# Patient Record
Sex: FEMALE | Race: OTHER | URBAN - METROPOLITAN AREA
[De-identification: names, ages, dates, MRNs, and addresses within clinical notes are randomized per-mention and may not be internally consistent; named-entity substitution may affect disease eponyms.]

---

## 2017-03-09 ENCOUNTER — OFFICE VISIT (OUTPATIENT)
Dept: FAMILY MEDICINE CLINIC | Age: 82
End: 2017-03-09

## 2017-03-09 VITALS
DIASTOLIC BLOOD PRESSURE: 62 MMHG | WEIGHT: 129 LBS | TEMPERATURE: 97.5 F | BODY MASS INDEX: 24.37 KG/M2 | HEART RATE: 65 BPM | SYSTOLIC BLOOD PRESSURE: 122 MMHG

## 2017-03-09 DIAGNOSIS — E03.9 ACQUIRED HYPOTHYROIDISM: Primary | ICD-10-CM

## 2017-03-09 DIAGNOSIS — I10 ESSENTIAL HYPERTENSION: ICD-10-CM

## 2017-03-09 DIAGNOSIS — E78.49 OTHER HYPERLIPIDEMIA: ICD-10-CM

## 2017-03-09 DIAGNOSIS — Z71.89 COUNSELING AND COORDINATION OF CARE: Primary | ICD-10-CM

## 2017-03-09 DIAGNOSIS — D64.9 ANEMIA, UNSPECIFIED TYPE: ICD-10-CM

## 2017-03-09 DIAGNOSIS — Z13.9 SCREENING: ICD-10-CM

## 2017-03-09 LAB — HGB BLD-MCNC: 11.5 G/DL

## 2017-03-09 NOTE — PROGRESS NOTES
Subjective:     Ifeanyi Rdz is a [de-identified] y.o. female who presents for follow up of htn, hyperlipidemia, hypothyroidism. Missed GI appt and was rescheduled for 5/24. Has refills on all meds. Cardiovascular ROS: taking medications as instructed, no medication side effects noted, no TIA's, no chest pain on exertion, no dyspnea on exertion, no swelling of ankles. Gets dizzy sometimes when she stands or when she misses a meal.  New concerns: none.      Patient Active Problem List   Diagnosis Code    Acquired hypothyroidism E03.9    Essential hypertension I10    Hyperlipidemia E78.5    DVT (deep venous thrombosis) ([facility]) I82.409     Past Medical History:   Diagnosis Date    Hypercholesterolemia     Hypertension     Thyroid disease      Social History   Substance Use Topics    Smoking status: Never Smoker    Smokeless tobacco: Not on file    Alcohol use No        Lab Results  Component Value Date/Time   Cholesterol, total 173 01/12/2016 11:47 AM   HDL Cholesterol 58 01/12/2016 11:47 AM   LDL, calculated 83.4 01/12/2016 11:47 AM   Triglyceride 158 01/12/2016 11:47 AM   CHOL/HDL Ratio 3.0 01/12/2016 11:47 AM       Lab Results  Component Value Date/Time   GFR est AA >60 10/27/2016 11:07 AM   GFR est non-AA >60 10/27/2016 11:07 AM   Creatinine 0.72 10/27/2016 11:07 AM   BUN 16 10/27/2016 11:07 AM   Sodium 137 10/27/2016 11:07 AM   Potassium 4.8 10/27/2016 11:07 AM   Chloride 104 10/27/2016 11:07 AM   CO2 27 10/27/2016 11:07 AM      Lab Results  Component Value Date/Time   TSH 0.55 10/27/2016 11:07 AM      No results found for: HBA1C, UMA2ZCWR, HGBE8, CXO4ZJHR, CRM2ZJFX        Objective:     Vitals 3/9/2017 12/22/2016 10/27/2016 4/6/2016 2/11/2016 2/4/2016 2/4/2016   Blood Pressure 122/62 129/68 133/79 137/76 151/79 174/93 165/86   Pulse 65 71 72 73 70 69 68   Temp 97.5 98.6 98 98 97.5 - 97.8   Resp - - - 16 - - -   Height - 5' 1\" - 5' 0.63\" 4' 11.843\" - 4' 11.843\"   Weight 129 lb 130 lb 131 lb 134 lb 3.2 oz 136 lb 6.4 oz - 136 lb   SpO2 - - - 99 - - -   BSA - 1.59 m2 - 1.61 m2 1.62 m2 - 1.61 m2   BMI - 24.56 kg/m2 - 25.67 kg/m2 26.78 kg/m2 - 26.7 kg/m2   BP comment - - - - - - [place]       Appearance: alert, well appearing, and in no distress. General exam: CVS exam BP noted to be well controlled today in office, S1, S2 normal, no gallop, no murmur, chest clear, no JVD, no HSM, no edema. Lab review:     Assessment/Plan:     hypertension well controlled, hyperlipidemia well controlled, hypothyroidism well-conntrolled  Has enough meds for 3 months, will give appt to f/u then. H/o mild anemia, recheck hb today and has GI eval scheduled.

## 2017-03-09 NOTE — PROGRESS NOTES
Results for orders placed or performed in visit on 03/09/17   AMB POC HEMOGLOBIN (HGB)   Result Value Ref Range    Hemoglobin (POC) 11.5

## 2017-03-16 ENCOUNTER — TELEPHONE (OUTPATIENT)
Dept: FAMILY MEDICINE CLINIC | Age: 82
End: 2017-03-16

## 2017-03-16 NOTE — TELEPHONE ENCOUNTER
Patient called today and stated she has not received any of her medication for her B/P as well as the cholesterol    Please call patient back       Call routed to  Call back nurse and Dr.Eddy MONTES DE OCA [facility] [place], [facility]

## 2017-03-16 NOTE — TELEPHONE ENCOUNTER
Called to pt w/ assistance of [employer] #[phone]. Reviewed on 12/22/16 the provider sent 6 months of medications in - thyroid, cholesterol, bp . Reviewed the bottles will be for 1 month and she will need to go back monthly for refills. Instructed to go to pharmacy for refills. Called to pharmacy and they have the refills on file.

## 2017-03-21 DIAGNOSIS — D64.9 ANEMIA, UNSPECIFIED TYPE: Primary | ICD-10-CM

## 2017-03-27 ENCOUNTER — TELEPHONE (OUTPATIENT)
Dept: FAMILY MEDICINE CLINIC | Age: 82
End: 2017-03-27

## 2017-03-27 NOTE — TELEPHONE ENCOUNTER
Attempted to contact the pt via phone. No answer on the phone. A voice mail message was left to return the call to the C[facility], and ask for the nurse.  Allan Burgess RN

## 2017-03-29 NOTE — PROGRESS NOTES
TC placed to pt, she plans to come to [facility] site tomorrow 3/30/17 for labs only visit, non-fasting to check Iron level.

## 2017-03-30 ENCOUNTER — HOSPITAL ENCOUNTER (OUTPATIENT)
Dept: LAB | Age: 82
Discharge: HOME | End: 2017-03-30

## 2017-03-30 ENCOUNTER — CLINICAL SUPPORT (OUTPATIENT)
Dept: FAMILY MEDICINE CLINIC | Age: 82
End: 2017-03-30

## 2017-03-30 DIAGNOSIS — D64.9 ANEMIA, UNSPECIFIED TYPE: ICD-10-CM

## 2017-03-30 DIAGNOSIS — Z13.9 SCREENING: Primary | ICD-10-CM

## 2017-03-30 LAB
IRON SATN MFR SERPL: 23 % (ref 20–50)
IRON SERPL-MCNC: 68 UG/DL (ref 35–150)
TIBC SERPL-MCNC: 293 UG/DL (ref 250–450)

## 2017-03-30 PROCEDURE — 83540 ASSAY OF IRON: CPT | Performed by: FAMILY MEDICINE

## 2017-03-30 NOTE — PROGRESS NOTES
Patient here for labs only, labs drawn was a Iron Profile drawn on the right arm, patient left lab area with no signs of bleeding or complaints, and was told a nurse or dr would call with results, with the help of Cookie Thakkar.  Zaid Payne MA.

## 2017-04-15 DIAGNOSIS — I10 ESSENTIAL HYPERTENSION: ICD-10-CM

## 2017-04-18 RX ORDER — LISINOPRIL 10 MG/1
TABLET ORAL
Qty: 30 TAB | Refills: 0 | Status: SHIPPED | OUTPATIENT
Start: 2017-04-18 | End: 2017-07-03 | Stop reason: SDUPTHER

## 2017-05-04 ENCOUNTER — CLINICAL SUPPORT (OUTPATIENT)
Dept: FAMILY MEDICINE CLINIC | Age: 82
End: 2017-05-04

## 2017-05-04 DIAGNOSIS — Z71.9 COUNSELED BY NURSE: Primary | ICD-10-CM

## 2017-05-04 NOTE — PROGRESS NOTES
S/w pt with  Kristie Manuel  Pt is in clinic today stating she is out of her Thyroid medication. The only prescription she had on hand was the one she received from [place]. Pt never picked up the prescription from [facility] [facility] in December. Call made to [facility], pt had prescription on file for the levothyroxine, with 6 refills. Pharmacy will fill it for her today. According to the pharmacy pt has the following available as of today: Pravastatin 2 months  Levothyroxine- 6 months  Lisinopril- 4 months  Pt will make appt to follow up in July with Dr An Arvizu. Pt verbalized understanding and will  rx.

## 2017-05-24 ENCOUNTER — OFFICE VISIT (OUTPATIENT)
Dept: FAMILY MEDICINE CLINIC | Age: 82
End: 2017-05-24

## 2017-05-24 DIAGNOSIS — M47.812 ARTHRITIS OF NECK: Primary | ICD-10-CM

## 2017-05-25 NOTE — PROGRESS NOTES
ORTHO CONSULT NOTE    Subjective:     Date of Consultation:  May 25, 2017    Loi Beard is a [de-identified] y.o. female who is being seen for right arm, shoulder and neck pain intermittiently for 3 years. Denies any specific event. States she worked hard all of her life to care for her children and grandchildren and that may have caused her symptoms. She takes Tramadol on occasion which helps. She seems most concerned about her H/O blood clots in 2011 where she was partially treated with Lovenox but due to the expense did not complete the prescribed course. She says it was in her left leg. She denies any leg pain, no SOB, dyspnea or anxiety. Denies any numbness, tingling or weakness in the right arm, she is RHD and has not had any pain for at least the past week      Patient Active Problem List    Diagnosis Date Noted    Acquired hypothyroidism 01/12/2016    Essential hypertension 01/12/2016    Hyperlipidemia 01/12/2016    DVT (deep venous thrombosis) ([facility] [address] 01/12/2016     Family History   Problem Relation Age of Onset    Family history unknown: Yes      Social History   Substance Use Topics    Smoking status: Never Smoker    Smokeless tobacco: Not on file    Alcohol use No     Past Medical History:   Diagnosis Date    Hypercholesterolemia     Hypertension     Thyroid disease       Past Surgical History:   Procedure Laterality Date    HX ORTHOPAEDIC      left ankle surgery. Prior to Admission medications    Medication Sig Start Date End Date Taking? Authorizing Provider   lisinopril (PRINIVIL, ZESTRIL) 10 mg tablet TAKE ONE TABLET BY MOUTH ONCE DAILY 4/18/17   Justo Triana NP   levothyroxine (SYNTHROID) 150 mcg tablet Take 0.5 Tabs by mouth Daily (before breakfast). 12/22/16   MARCE Woodruff   pravastatin (PRAVACHOL) 10 mg tablet Take 1 Tab by mouth nightly. 12/22/16   MARCE Woodruff   traMADol (ULTRAM) 50 mg tablet Take 0.5 Tabs by mouth every six (6) hours as needed for Pain.  Max Daily Amount: 100 mg. 10/27/16   [facility], NP   aspirin delayed-release 81 mg tablet Take  by mouth daily. Historical Provider   acetaminophen (TYLENOL) 650 mg CR tablet Take 650 mg by mouth every six (6) hours as needed for Pain. Historical Provider   Aspirin, Buffered 81 mg tab Take  by mouth. Historical Provider     Current Outpatient Prescriptions   Medication Sig    lisinopril (PRINIVIL, ZESTRIL) 10 mg tablet TAKE ONE TABLET BY MOUTH ONCE DAILY    levothyroxine (SYNTHROID) 150 mcg tablet Take 0.5 Tabs by mouth Daily (before breakfast).  pravastatin (PRAVACHOL) 10 mg tablet Take 1 Tab by mouth nightly.  traMADol (ULTRAM) 50 mg tablet Take 0.5 Tabs by mouth every six (6) hours as needed for Pain. Max Daily Amount: 100 mg.  aspirin delayed-release 81 mg tablet Take  by mouth daily.  acetaminophen (TYLENOL) 650 mg CR tablet Take 650 mg by mouth every six (6) hours as needed for Pain.  Aspirin, Buffered 81 mg tab Take  by mouth. No current facility-administered medications for this visit. No Known Allergies     Review of Systems:  A comprehensive review of systems was negative except for that written in the HPI. Mental Status:  Alert and oriented      Objective:     Exam: alert, cooperative, no distress, appears stated age,    Neuro: no focal deficits. Extremities: No deformity or limitations in ROM of the arm, shoulder, and neck. DTR's 2/4 and symmetrical, negative Daugherty's and Froment's signs. No atrophy    Imaging Review: None available for review      Plan:   Probable arthritis of the neck and shoulder with possible radiating pain. Recommend ice and Tramadol. ROM exercises of the neck and shoulder. If symptoms persist, x-rays may be useful to determine specific diagnosis, though treatment options likely limited except for conservative ones.   F/U with PCP regarding concern for residual DVT in [address] [address], [place]

## 2017-07-03 ENCOUNTER — OFFICE VISIT (OUTPATIENT)
Dept: FAMILY MEDICINE CLINIC | Age: 82
End: 2017-07-03

## 2017-07-03 ENCOUNTER — HOSPITAL ENCOUNTER (OUTPATIENT)
Dept: LAB | Age: 82
Discharge: HOME | End: 2017-07-03

## 2017-07-03 VITALS
HEART RATE: 64 BPM | DIASTOLIC BLOOD PRESSURE: 69 MMHG | TEMPERATURE: 97.9 F | WEIGHT: 127 LBS | SYSTOLIC BLOOD PRESSURE: 128 MMHG | BODY MASS INDEX: 24 KG/M2

## 2017-07-03 DIAGNOSIS — E03.9 ACQUIRED HYPOTHYROIDISM: Primary | ICD-10-CM

## 2017-07-03 DIAGNOSIS — E03.9 ACQUIRED HYPOTHYROIDISM: ICD-10-CM

## 2017-07-03 DIAGNOSIS — Z13.9 ENCOUNTER FOR SCREENING: ICD-10-CM

## 2017-07-03 DIAGNOSIS — I10 ESSENTIAL HYPERTENSION: ICD-10-CM

## 2017-07-03 LAB
BASOPHILS # BLD AUTO: 0 K/UL (ref 0–0.1)
BASOPHILS # BLD: 0 % (ref 0–1)
EOSINOPHIL # BLD: 0 K/UL (ref 0–0.4)
EOSINOPHIL NFR BLD: 1 % (ref 0–7)
ERYTHROCYTE [DISTWIDTH] IN BLOOD BY AUTOMATED COUNT: 15.2 % (ref 11.5–14.5)
GLUCOSE POC: NORMAL MG/DL
HCT VFR BLD AUTO: 33.4 % (ref 35–47)
HGB BLD-MCNC: 10.2 G/DL
HGB BLD-MCNC: 10.3 G/DL (ref 11.5–16)
LYMPHOCYTES # BLD AUTO: 38 % (ref 12–49)
LYMPHOCYTES # BLD: 2.3 K/UL (ref 0.8–3.5)
MCH RBC QN AUTO: 26.9 PG (ref 26–34)
MCHC RBC AUTO-ENTMCNC: 30.8 G/DL (ref 30–36.5)
MCV RBC AUTO: 87.2 FL (ref 80–99)
MONOCYTES # BLD: 0.4 K/UL (ref 0–1)
MONOCYTES NFR BLD AUTO: 7 % (ref 5–13)
NEUTS SEG # BLD: 3.3 K/UL (ref 1.8–8)
NEUTS SEG NFR BLD AUTO: 54 % (ref 32–75)
PLATELET # BLD AUTO: 216 K/UL (ref 150–400)
RBC # BLD AUTO: 3.83 M/UL (ref 3.8–5.2)
TSH SERPL DL<=0.05 MIU/L-ACNC: 0.84 UIU/ML (ref 0.36–3.74)
WBC # BLD AUTO: 6 K/UL (ref 3.6–11)

## 2017-07-03 PROCEDURE — 85025 COMPLETE CBC W/AUTO DIFF WBC: CPT | Performed by: FAMILY MEDICINE

## 2017-07-03 PROCEDURE — 84443 ASSAY THYROID STIM HORMONE: CPT | Performed by: FAMILY MEDICINE

## 2017-07-03 RX ORDER — LISINOPRIL 10 MG/1
TABLET ORAL
Qty: 30 TAB | Refills: 2 | Status: SHIPPED | OUTPATIENT
Start: 2017-07-03 | End: 2017-10-19 | Stop reason: DRUGHIGH

## 2017-07-03 RX ORDER — PRAVASTATIN SODIUM 10 MG/1
10 TABLET ORAL
Qty: 30 TAB | Refills: 5 | Status: SHIPPED | OUTPATIENT
Start: 2017-07-03 | End: 2017-10-19 | Stop reason: SDUPTHER

## 2017-07-03 NOTE — PROGRESS NOTES
Coordination of Care  1. Have you been to the ER, urgent care clinic since your last visit? Hospitalized since your last visit? No    2. Have you seen or consulted any other health care providers outside of the [address] [place] [address] since your last visit? Include any pap smears or colon screening.  No    Medications  Medication Reconciliation Performed: no  Patient does need refills     Learning Assessment Complete? yes  Results for orders placed or performed in visit on 07/03/17   AMB POC GLUCOSE BLOOD, BY GLUCOSE MONITORING DEVICE   Result Value Ref Range    Glucose POC 99 NF mg/dL   AMB POC HEMOGLOBIN (HGB)   Result Value Ref Range    Hemoglobin (POC) 10.2

## 2017-07-03 NOTE — PATIENT INSTRUCTIONS
Tiene poco de anemia. Es importante a comer talia, incluyendo frutas, vegetales, carne, carbohydratas.

## 2017-07-03 NOTE — MR AVS SNAPSHOT
Visit Information Sienna Gonzaleztylerangie Personal Médico Departamento Teléfono del Dep. Número de visita 7/3/2017  8:30 AM Munir Green MD [address] [phone] Upcoming Health Maintenance Date Due ZOSTER VACCINE AGE 60> 11/23/1996 GLAUCOMA SCREENING Q2Y 11/23/2001 OSTEOPOROSIS SCREENING (DEXA) 11/23/2001 MEDICARE YEARLY EXAM 11/23/2001 Pneumococcal 65+ Low/Medium Risk (2 of 2 - PCV13) 4/21/2017 INFLUENZA AGE 9 TO ADULT 8/1/2017 DTaP/Tdap/Td series (2 - Td) 4/21/2026 Alergias  Review Complete El: 5/25/2017 Por: MARCE Hastings  
 A partir del:  7/3/2017 No Known Allergies Vacunas actuales Revisadas el:  4/21/2016 Lorrie Joe Influenza Vaccine 12/22/2016, 1/12/2016 Influenza Vaccine (Quad) PF 12/22/2016, 1/12/2016 Pneumococcal Polysaccharide (PPSV-23) 4/21/2016 TB Skin Test (PPD) 4/6/2016 Tdap 4/21/2016 No revisadas esta visita You Were Diagnosed With   
  
 Eh Degree Acquired hypothyroidism    -  Primary ICD-10-CM: E03.9 ICD-9-CM: 244.9 Encounter for screening     ICD-10-CM: Z13.9 ICD-9-CM: V82.9 Partes vitales PS Pulso Temperatura Peso (percentil de crecimiento) BMI (IMC) Estado obstétrico  
 128/69 (BP 1 Location: Right arm, BP Patient Position: Sitting) 64 97.9 °F (36.6 °C) (Oral) 127 lb (57.6 kg) 24 kg/m2 Postmenopausal  
 Estatus de tabaquísmo Never Smoker Historial de signos vitales BMI and BSA Data Body Mass Index Body Surface Area  
 24 kg/m 2 1.57 m 2 [facility] Name Phone Dina Oliver [facility] [phone] Flanagan lista de medicamentos actualizada Lista actualizada el: 7/3/17  9:26 AM.  Raynaldo Quails use flanagan lista de medicamentos más reciente. acetaminophen 650 mg CR tablet También conocido parviz:  TYLENOL  
 Take 650 mg by mouth every six (6) hours as needed for Pain. aspirin delayed-release 81 mg tablet Take  by mouth daily. aspirin, buffered 81 mg Tab Take  by mouth.  
  
 levothyroxine 150 mcg tablet También conocido parviz:  SYNTHROID Take 0.5 Tabs by mouth Daily (before breakfast). lisinopril 10 mg tablet También conocido parviz:  PRINIVIL, ZESTRIL  
TAKE ONE TABLET BY MOUTH ONCE DAILY pravastatin 10 mg tablet También conocido parviz:  PRAVACHOL Take 1 Tab by mouth nightly. traMADol 50 mg tablet También conocido parviz:  ULTRAM  
Take 0.5 Tabs by mouth every six (6) hours as needed for Pain. Max Daily Amount: 100 mg. Hicimos lo siguiente AMB POC GLUCOSE BLOOD, BY GLUCOSE MONITORING DEVICE [64020 CPT(R)] AMB POC HEMOGLOBIN (HGB) [55436 CPT(R)] Por hacer 07/03/2017 Lab:  CBC WITH AUTOMATED DIFF   
  
 07/03/2017 Lab:  TSH 3RD GENERATION Instrucciones para el Paciente Tiene poco de anemia. Es importante a comer talia, incluyendo frutas, vegetales, carne, carbohydratas. Introducing [facility]! [facility] introduce portal paciente MyChart . Ahora se puede acceder a partes de hoffman expediente médico, enviar por correo electrónico la oficina de hoffman médico y solicitar renovaciones de medicamentos en línea. En hoffman navegador de Internet , Love Valdovinos a [email or web address] Delon clic en el usuario por Sola Roth? Urban Dawson clic aquí en la sesión Rosalie Batista. Verá la página de registro [place]. Ingrese hoffman código de [employer] maribell y parviz aparece a continuación. Usted no tendrá que [employer] código después de renan completado el proceso de registro . Si usted no se inscribe antes de la fecha de caducidad , debe solicitar un nuevo código. · MyChart Código de acceso : Delona Dearth Expires: 10/1/2017  9:26 AM 
 
Ingresa los últimos cuatro dígitos de hoffman Número de Seguro Social ( xxxx ) y fecha de nacimiento ( dd / mm / aaaa ) parviz se indica y delon clic en Enviar. Usted será llevado a la siguiente página de registro . Crear un ID MyChart . Esta será hoffman ID de inicio de sesión de MyChart y no puede ser [place] , por lo que pensar en kendall que es [place] Juan y fácil de recordar . Crear kendall contraseña MyChart . Usted puede cambiar hoffman contraseña en cualquier momento . Ingrese hoffman Password Reset de preguntas y Jeter . [place] se puede utilizar en un momento posterior si usted olvida hoffman contraseña. Introduzca hoffman dirección de correo electrónico . Roxy Eng recibirá kendall notificación por correo electrónico cuando la nueva información está disponible en MyChart . Charito Rising clic en Registrarse. Dianah Simmonds francisco y descargar porciones de hoffman expediente médico. 
Delon clic en el enlace de descarga del menú Resumen para descargar kendall copia portátil de hoffman información médica . Si tiene [employer] , por favor visite la sección de preguntas frecuentes del sitio web MyChart . Recuerde, MyChart NO es que se utilizará para las necesidades urgentes. Para emergencias médicas , llame al 911 . Ahora disponible en hoffman iPhone y Android ! Por favor proporcione ian resumen de la documentación de cuidado a hoffman próximo proveedor. If you have any questions after today's visit, please call [phone].

## 2017-07-03 NOTE — PROGRESS NOTES
HISTORY OF PRESENT ILLNESS  Doc Barber is a [de-identified] y.o. female. HPI Comments: Poor appetite lately with dizziness just when standing up. Has been taking occas tramadol per Carrie Los Indios, but she says that doesn't cause sx. Shyam Whitmore wanted her check for recurrent DVT. Pt tells me the 'bones' of her calves sometimes bother her, but no swelling. H/o DVT 2011 by her hx. Reports her dental plate has been bothering her and she wonders if that is why she isn't hungry. Has A/C where she lives and lives with her daughter. No cough, fever, sob or sscp. Feels cold all of the time. Hypertension          ROS    Physical Exam   Constitutional: She appears well-developed and well-nourished. No distress. Wt down another 2 lb., down 10 lb from 1/16   Neck: No thyromegaly present. Cardiovascular: Normal rate, regular rhythm and normal heart sounds. Exam reveals no gallop and no friction rub. No murmur heard. Pulmonary/Chest: Breath sounds normal.   Abdominal: Soft. She exhibits no mass. There is no tenderness. Musculoskeletal: She exhibits no edema. No leg width discrepancy. ASSESSMENT and PLAN  Htn, ?could be overtreating considering wt. Loss and decreased eating. To try to get a bp at the pharmacy and bring in results. F/u 1 month. Anemia, hb here lower than last cbc, didn't appear to be fe def. Check cbc. Neck djd-- continue tramadol prn, which she doesn't seem to have side-effects of. Poorly-fitting dental appliance. To dentist.  Hypothyroidism-- recheck tsh because of weight loss, will wait to send med on result.

## 2017-10-19 ENCOUNTER — HOSPITAL ENCOUNTER (OUTPATIENT)
Dept: LAB | Age: 82
Discharge: HOME | End: 2017-10-19

## 2017-10-19 ENCOUNTER — OFFICE VISIT (OUTPATIENT)
Dept: FAMILY MEDICINE CLINIC | Age: 82
End: 2017-10-19

## 2017-10-19 VITALS
DIASTOLIC BLOOD PRESSURE: 72 MMHG | WEIGHT: 127 LBS | SYSTOLIC BLOOD PRESSURE: 137 MMHG | HEIGHT: 61 IN | BODY MASS INDEX: 23.98 KG/M2 | HEART RATE: 72 BPM | TEMPERATURE: 98.6 F

## 2017-10-19 DIAGNOSIS — R42 DIZZINESS: Primary | ICD-10-CM

## 2017-10-19 DIAGNOSIS — E03.9 ACQUIRED HYPOTHYROIDISM: ICD-10-CM

## 2017-10-19 DIAGNOSIS — E78.5 HYPERLIPIDEMIA, UNSPECIFIED HYPERLIPIDEMIA TYPE: ICD-10-CM

## 2017-10-19 DIAGNOSIS — H54.7 VISION PROBLEM: ICD-10-CM

## 2017-10-19 DIAGNOSIS — I10 ESSENTIAL HYPERTENSION: ICD-10-CM

## 2017-10-19 LAB
25(OH)D3 SERPL-MCNC: 32.7 NG/ML (ref 30–100)
ANION GAP SERPL CALC-SCNC: 6 MMOL/L (ref 5–15)
BASOPHILS # BLD: 0 K/UL (ref 0–0.1)
BASOPHILS NFR BLD: 0 % (ref 0–1)
BUN SERPL-MCNC: 16 MG/DL (ref 6–20)
BUN/CREAT SERPL: 22 (ref 12–20)
CALCIUM SERPL-MCNC: 8.9 MG/DL (ref 8.5–10.1)
CHLORIDE SERPL-SCNC: 104 MMOL/L (ref 97–108)
CO2 SERPL-SCNC: 29 MMOL/L (ref 21–32)
CREAT SERPL-MCNC: 0.74 MG/DL (ref 0.55–1.02)
EOSINOPHIL # BLD: 0.1 K/UL (ref 0–0.4)
EOSINOPHIL NFR BLD: 1 % (ref 0–7)
ERYTHROCYTE [DISTWIDTH] IN BLOOD BY AUTOMATED COUNT: 14.6 % (ref 11.5–14.5)
GLUCOSE SERPL-MCNC: 102 MG/DL (ref 65–100)
HCT VFR BLD AUTO: 35.5 % (ref 35–47)
HGB BLD-MCNC: 11.1 G/DL (ref 11.5–16)
LYMPHOCYTES # BLD: 2.8 K/UL (ref 0.8–3.5)
LYMPHOCYTES NFR BLD: 42 % (ref 12–49)
MCH RBC QN AUTO: 26.6 PG (ref 26–34)
MCHC RBC AUTO-ENTMCNC: 31.3 G/DL (ref 30–36.5)
MCV RBC AUTO: 84.9 FL (ref 80–99)
MONOCYTES # BLD: 0.3 K/UL (ref 0–1)
MONOCYTES NFR BLD: 5 % (ref 5–13)
NEUTS SEG # BLD: 3.4 K/UL (ref 1.8–8)
NEUTS SEG NFR BLD: 52 % (ref 32–75)
PLATELET # BLD AUTO: 232 K/UL (ref 150–400)
POTASSIUM SERPL-SCNC: 4.7 MMOL/L (ref 3.5–5.1)
RBC # BLD AUTO: 4.18 M/UL (ref 3.8–5.2)
SODIUM SERPL-SCNC: 139 MMOL/L (ref 136–145)
TSH SERPL DL<=0.05 MIU/L-ACNC: 0.59 UIU/ML (ref 0.36–3.74)
WBC # BLD AUTO: 6.6 K/UL (ref 3.6–11)

## 2017-10-19 PROCEDURE — 82306 VITAMIN D 25 HYDROXY: CPT | Performed by: FAMILY MEDICINE

## 2017-10-19 PROCEDURE — 85025 COMPLETE CBC W/AUTO DIFF WBC: CPT | Performed by: FAMILY MEDICINE

## 2017-10-19 PROCEDURE — 82607 VITAMIN B-12: CPT | Performed by: FAMILY MEDICINE

## 2017-10-19 PROCEDURE — 80048 BASIC METABOLIC PNL TOTAL CA: CPT | Performed by: FAMILY MEDICINE

## 2017-10-19 PROCEDURE — 84443 ASSAY THYROID STIM HORMONE: CPT | Performed by: FAMILY MEDICINE

## 2017-10-19 RX ORDER — LISINOPRIL 5 MG/1
5 TABLET ORAL DAILY
Qty: 90 TAB | Refills: 1 | Status: SHIPPED | OUTPATIENT
Start: 2017-10-19 | End: 2017-11-30

## 2017-10-19 RX ORDER — PRAVASTATIN SODIUM 10 MG/1
10 TABLET ORAL
Qty: 90 TAB | Refills: 3 | Status: SHIPPED | OUTPATIENT
Start: 2017-10-19 | End: 2018-08-07 | Stop reason: SDUPTHER

## 2017-10-19 NOTE — PROGRESS NOTES
Fronie Prader is a [de-identified] y.o. female    Issues discussed today include:    1) Pain behind ears:  Started 2 weeks ago. Behind both ears. When she gets up from sitting or bends over her \"head is spinning. \" Denies room spinning, nausea or eadaches. Her daughter thinks it is they way she is sleeping, but pt does not agree with this. Dizziness and pain does NOT occur at night. At times when walking during day, feels dizzy and has to grab hold of something before continues walking. Wearing glasses from 2014, thinks she needs new ones. + decreased appetite. No focal weakness, speech changes. No periods of confusion/amnesia. Not taking tramadol on med list when dizziness occurs, rather takes that rarely before bed. Takes lisinopril 10mg daily for HTN. No checking BP at home. Data reviewed or ordered today:       Other problems include:  Patient Active Problem List   Diagnosis Code    Acquired hypothyroidism E03.9    Essential hypertension I10    Hyperlipidemia E78.5    [facility]deep [facility] thrombosis[facility] ([facility]) I82.409       Medications:  Current Outpatient Prescriptions   Medication Sig Dispense Refill    levothyroxine (SYNTHROID) 150 mcg tablet Take 0.5 Tabs by mouth Daily (before breakfast). 30 Tab 5    lisinopril (PRINIVIL, ZESTRIL) 5 mg tablet Take 1 Tab by mouth daily. [place] 1 tableta kendall vez al naina 90 Tab 1    pravastatin (PRAVACHOL) 10 mg tablet Take 1 Tab by mouth nightly. [place] 1 tableta kendall vez al noche 90 Tab 3    traMADol (ULTRAM) 50 mg tablet Take 0.5 Tabs by mouth every six (6) hours as needed for Pain. Max Daily Amount: 100 mg. 20 Tab 1    aspirin delayed-release 81 mg tablet Take  by mouth daily.  acetaminophen (TYLENOL) 650 mg CR tablet Take 650 mg by mouth every six (6) hours as needed for Pain.  Aspirin, Buffered 81 mg tab Take  by mouth. Allergies:  No Known Allergies    LMP:  No LMP recorded.  Patient is postmenopausal.    Social History     Social History    Marital status: SINGLE     Spouse name: N/A    Number of children: N/A    Years of education: N/A     Occupational History    Not on file. Social History Main Topics    Smoking status: Never Smoker    Smokeless tobacco: Never Used    Alcohol use No    Drug use: No    Sexual activity: Not on file     Other Topics Concern    Not on file     Social History Narrative    ** Merged History Encounter **            Family History   Problem Relation Age of Onset    Family history unknown: Yes       ROS:   Chest Pain:  No  SOB:  No      Physical Exam  Visit Vitals    /72 (BP 1 Location: Right arm, BP Patient Position: Sitting)    Pulse 72    Temp 98.6 °F (37 °C) (Oral)    Ht 5' 1.42\" (1.56 m)    Wt 127 lb (57.6 kg)    BMI 23.67 kg/m2     BP Readings from Last 3 Encounters:   10/19/17 137/72   07/03/17 128/69   03/09/17 122/62     Constitutional: Appears well,  No acute distress, Vitals noted  Psychiatric:  Affect normal, Alert and Oriented to person/place/time  Eyes:  Conjunctiva clear, no drainage  ENT:  External ears and nose normal, Teeth and gums appear healthy, Mucous membranes moist. Post auricular/mastoids without tenderness. TMs grey and non-bulging bilaterally. OP without erythema, edema or exudate. Bilateral nares without active drainage, edema or polyps. Neck:  General inspection normal. Supple. FROM without rigidity. Lateral cervical muscles mildly tender. No lymphadenopathy. Heart:  Normal HR, Normal S1 and S2,  Regular rhythm. No murmurs, rubs or gallops.    Lungs:  Clear to auscultation, good respiratory effort, no wheezes, rales or rhonchi  Extremities:  Without edema, good peripheral pulses  Skin:  Warm to palpation, without rashes  Neuro: CNII-XII intact, symmetric and intact sensation to light touch throughout, equal and full motor strength in all extremities, DTRs symmetric and normal, neg pronator drift and rhomberg        Assessment/Plan:      ICD-10-CM ICD-9-CM    1. Dizziness R42 780.4 VITAMIN D, 25 HYDROXY      VITAMIN B12 & FOLATE   2. Essential hypertension I10 401.9 CBC WITH AUTOMATED DIFF      METABOLIC PANEL, BASIC      lisinopril (PRINIVIL, ZESTRIL) 5 mg tablet   3. Vision problem H54.7 V41.0    4. Hyperlipidemia, unspecified hyperlipidemia type E78.5 272.4 pravastatin (PRAVACHOL) 10 mg tablet   5. Acquired hypothyroidism E03.9 244.9 TSH 3RD GENERATION      levothyroxine (SYNTHROID) 150 mcg tablet       Opto resources given, recommend eye exam and renewed glasses rx  Reduce lisinopril from 10mg to 5mg daily, possible orthostasis causing dizziness  Cervical exercises, may be component of torticollis or cervical muscle strain  Labs today and will send refill for levothyroxine once labs back     Addendum on 10/28/17:  TSH wnl (has been wnl for > 1 yr), levothyroxine rx refilled  Vit B12 borderline at 297 (borderline 200-300, normal > 300) - will need MMA and homocysteine labs in addition to anemia labs    Follow-up Disposition:  Return for TBD pending lab results.         Peri Rome MD  [address] [place] [address]

## 2017-10-19 NOTE — PATIENT INSTRUCTIONS
Mareos: Instrucciones de cuidado - [ Dizziness: Care Instructions ]  Instrucciones de cuidado  Los mareos son Gregoria Lady sensación de inestabilidad o confusión en la shalini. Son distintos al vértigo, kendall sensación de que la habitación gira o de que usted se mueve o . También es distinto del aturdimiento, que es la sensación de que está a punto de desmayarse. Puede resultar difícil conocer la causa de los Archer. Algunas personas se sienten mareadas cuando tienen migrañas. A veces, los episodios gripales pueden hacer que se sienta mareado. Algunas afecciones médicas, parviz los problemas cardíacos o la presión arterial jadyn, pueden hacer que se sienta mareado. Muchos medicamentos pueden causar mareos, parviz los que se usan para la presión arterial jadyn, el dolor o la ansiedad. Si es un medicamento el que está causando los síntomas, hoffman médico podría recomendarle que lo cambie o deje de tomarlo. Si es un problema cardíaco, podría necesitar medicamentos para ayudar a que hoffman corazón funcione mejor. Si no hay razón aparente para los síntomas, hoffman médico podría sugerir vigilar y esperar berkley un tiempo para rfancisco si los mareos desaparecen por sí solos. La atención de seguimiento es kendall parte clave de hoffman tratamiento y seguridad. Asegúrese de hacer y acudir a todas las citas, y llame a hoffman médico si está teniendo problemas. También es kendall buena idea saber los resultados de rhonda exámenes y mantener kendall lista de los medicamentos que thea. Cómo puede cuidarse en el hogar? · Si hoffman médico le recomienda o receta medicamentos, tómelos exactamente según las indicaciones. Llame a hoffman médico si reynaldo estar teniendo un problema con hoffman medicamento. · No conduzca mientras se sienta mareado. · Trate de prevenir las caídas. Algunas medidas que puede clark son:  Justin Crick Usar tapetes antideslizantes, agregar agarraderas cerca de la yashira y usar luces nocturnas.   ¨ Ordenar hoffman casa de maribell manera que en los senderos no haya nada con lo que se pueda tropezar. ¨ Avisarles a familiares y [facility] que se ha estado sintiendo [place]os. Pisinemo les servirá para saber cómo ayudarle. Cuándo debe pedir ayuda? Llame al 911 en cualquier momento que considere que necesita atención de [place]. Por ejemplo, llame si:  · Se desmayó (perdió el conocimiento). · Tiene mareos junto con síntomas de un ataque cardíaco. Estos pueden incluir:  ¨ Dolor de pecho, o presión o kendall sensación extraña en el pecho. ¨ Sudoración. ¨ Falta de aire. ¨ Náuseas o vómito. ¨ Dolor, presión, o kendall sensación extraña en la espalda, el christianne, la mandíbula o el abdomen superior, o en jones o ambos hombros o brazos. ¨ Aturdimiento o debilidad repentina. ¨ Un latido cardíaco rápido o irregular. · Tiene síntomas de un ataque cerebral. Estos pueden incluir:  ¨ Entumecimiento, hormigueo, debilidad o parálisis repentinos en la leona, el brazo o la pierna, sobre todo si ocurre en un solo lado del cuerpo. ¨ Cambios súbitos en la vista. ¨ Problemas repentinos para hablar. ¨ Confusión súbita o dificultad repentina para comprender frases sencillas. ¨ Problemas repentinos para caminar o mantener el equilibrio. ¨ Un dolor de shalini intenso y repentino, distinto a los oscar de shalini anteriores. Llame a hoffman médico ahora mismo o busque atención médica inmediata si:  · Se siente mareado y [place], dolor de [facility] o zumbido [employer] oídos. · Tiene nuevas náuseas y vómito o [address] · Kelly mareos no desaparecen o regresan. Preste especial atención a los cambios en hoffman beba y asegúrese de comunicarse con hoffman médico si:  · No mejora parviz se esperaba. Dónde puede encontrar más información en inglés? Arabella Infante a http://rom-agueda.info/. Gen Mercer L835 en la búsqueda para aprender más acerca de \"Mareos: Instrucciones de cuidado - [ Dizziness: Care Instructions ]. \"  Revisado: 20 marzo, 2017  Versión del contenido: 11.3  © 7370-2276 [employer], Incorporated.  [address] fueron adaptadas bajo licencia por [facility]ctions (which disclaims liability or warranty for this information). Si usted tiene Travis Ogilvie afección médica o sobre estas instrucciones, siempre pregunte a hoffman profesional de beba. [facility] niega toda garantía o responsabilidad por hoffman uso de esta información.

## 2017-10-19 NOTE — ACP (ADVANCE CARE PLANNING)
Honoring Choices was given to patient and invited to talk with nurse navigator.  Lisette Schaefer MA

## 2017-10-19 NOTE — PROGRESS NOTES
Reviewed AVS, prescription and pharmacy location with patient. Pt was directed to go to registration to make f/u appt. In 3 months. Patient declined flu vaccine today. Vision resource sheet given. Discussed with patient that levothyroxine prescription was not sent today but that provider will send prescription once lab work from today is reviewed. Instructed patient to call us in about 1 week if she has not received a new prescription for levothyroxine or a call about her labs. Patient verbalized understanding . No questions or concern from patient at this time.  Thalia Mcghee RN

## 2017-10-19 NOTE — PROGRESS NOTES
Coordination of Care  1. Have you been to the ER, urgent care clinic since your last visit? Hospitalized since your last visit? No    2. Have you seen or consulted any other health care providers outside of the [address] [place] [address] since your last visit? Include any pap smears or colon screening. No    Medications  Does the patient need refills? N/A    Learning Assessment Complete?  yes

## 2017-10-20 LAB
FOLATE SERPL-MCNC: 35.1 NG/ML (ref 5–21)
VIT B12 SERPL-MCNC: 297 PG/ML (ref 211–911)

## 2017-10-21 NOTE — PROGRESS NOTES
Vit B12 and folate not low  TSH wnl, no evidence of thyroid disease  Vit D level normal, > 30  BMP wnl, no kidney or electrolyte problems identified  CBC with persistent, mild normocytic anemia. Pt is [de-identified] yo, unclear if this is iron deficiency or anemia of chronic disease. Recommend pt return for follow up with provider to check up on dizziness. Will plan to check additional anemia labs then (iron profile, ferritin, haptoglobin, peripheral smear, reticulocyte count) prior to initiating therapy.   ** Nurse to please call and assist pt with scheduling follow up in the next 3-4 weeks, thank you

## 2017-10-25 NOTE — PROGRESS NOTES
Tc to the pt using [employer] int #[id number]. No answer on the phone. A generic message was left for the pt to call the [facility] and ask to speak to the nurse.  Jaja Baer RN

## 2017-10-28 RX ORDER — LEVOTHYROXINE SODIUM 150 UG/1
75 TABLET ORAL
Qty: 30 TAB | Refills: 5 | Status: SHIPPED | OUTPATIENT
Start: 2017-10-28 | End: 2017-11-30 | Stop reason: SDUPTHER

## 2017-10-30 NOTE — PROGRESS NOTES
Result note relayed to daughter Harriette Duane, on PHI (which is best point of contact for this pt, Víctor was update w/this info). The soonest appt w/any provider was not until 11/30, so this was given to the pt. Pt will see Dr Dina Bennett on this date. No questions by patient's daughter.

## 2017-11-30 ENCOUNTER — OFFICE VISIT (OUTPATIENT)
Dept: FAMILY MEDICINE CLINIC | Age: 82
End: 2017-11-30

## 2017-11-30 VITALS
SYSTOLIC BLOOD PRESSURE: 110 MMHG | WEIGHT: 122 LBS | DIASTOLIC BLOOD PRESSURE: 58 MMHG | TEMPERATURE: 97.8 F | HEART RATE: 71 BPM | BODY MASS INDEX: 22.74 KG/M2

## 2017-11-30 DIAGNOSIS — E03.9 ACQUIRED HYPOTHYROIDISM: ICD-10-CM

## 2017-11-30 DIAGNOSIS — G89.29 CHRONIC RIGHT SHOULDER PAIN: ICD-10-CM

## 2017-11-30 DIAGNOSIS — R42 DIZZINESS: Primary | ICD-10-CM

## 2017-11-30 DIAGNOSIS — Z13.9 ENCOUNTER FOR SCREENING: ICD-10-CM

## 2017-11-30 DIAGNOSIS — D64.9 NORMOCYTIC ANEMIA: ICD-10-CM

## 2017-11-30 DIAGNOSIS — E53.8 LOW VITAMIN B12 LEVEL: ICD-10-CM

## 2017-11-30 DIAGNOSIS — M25.511 CHRONIC RIGHT SHOULDER PAIN: ICD-10-CM

## 2017-11-30 LAB — HGB BLD-MCNC: 11.5 G/DL

## 2017-11-30 RX ORDER — TRAMADOL HYDROCHLORIDE 50 MG/1
25 TABLET ORAL
Qty: 30 TAB | Refills: 0 | Status: SHIPPED | OUTPATIENT
Start: 2017-11-30 | End: 2018-08-07 | Stop reason: SDUPTHER

## 2017-11-30 RX ORDER — LEVOTHYROXINE SODIUM 150 UG/1
75 TABLET ORAL
Qty: 45 TAB | Refills: 2 | Status: SHIPPED | OUTPATIENT
Start: 2017-11-30 | End: 2018-08-08 | Stop reason: SDUPTHER

## 2017-11-30 NOTE — PROGRESS NOTES
AVS printed and reviewed. Tramadol script was signed by provider and given to pt. Good RX coupon done for [facility] for Tramadol cost of approx $5.00. Reviewed NO Lisinopril . Instructed to drink 3-4 cups of water during the day. Discussion assisted by CAV , Baldo Damon.

## 2017-11-30 NOTE — PROGRESS NOTES
Coordination of Care  1. Have you been to the ER, urgent care clinic since your last visit? Hospitalized since your last visit? No    2. Have you seen or consulted any other health care providers outside of the [address] [place] [address] since your last visit? Include any pap smears or colon screening. No    Medications  Does the patient need refills?  NO    Learning Assessment Complete? yes  Results for orders placed or performed in visit on 11/30/17   AMB POC HEMOGLOBIN (HGB)   Result Value Ref Range    Hemoglobin (POC) 11.5

## 2017-11-30 NOTE — MR AVS SNAPSHOT
Visit Information Marisela Hall Personal Médico Departamento Teléfono del Dep. Número de visita 11/30/2017 11:00 AM Pipo Weiner MD [address] [phone] Follow-up Instructions Return in about 3 months (around 2/28/2018) for Follow up appt, sooner if concerns/persistent sxs. Upcoming Health Maintenance Date Due ZOSTER VACCINE AGE 60> 9/23/1996 GLAUCOMA SCREENING Q2Y 11/23/2001 OSTEOPOROSIS SCREENING (DEXA) 11/23/2001 MEDICARE YEARLY EXAM 11/23/2001 Pneumococcal 65+ Low/Medium Risk (2 of 2 - PCV13) 4/21/2017 Influenza Age 5 to Adult 8/1/2017 DTaP/Tdap/Td series (2 - Td) 4/21/2026 Alergias  Review Complete El: 11/30/2017 Por: Pipo Weiner MD  
 A partir del:  11/30/2017 No Known Allergies Vacunas actuales Revisadas el:  4/21/2016 [place] Influenza Vaccine 12/22/2016, 1/12/2016 Influenza Vaccine (Quad) PF 12/22/2016, 1/12/2016 Pneumococcal Polysaccharide (PPSV-23) 4/21/2016 TB Skin Test (PPD) 4/6/2016 Tdap 4/21/2016 No revisadas esta visita You Were Diagnosed With   
  
 Saad Stammer Dizziness    -  Primary ICD-10-CM: R35 ICD-9-CM: 780.4 Acquired hypothyroidism     ICD-10-CM: E03.9 ICD-9-CM: 072. 9 Chronic right shoulder pain     ICD-10-CM: M25.511, G89.29 ICD-9-CM: 719.41, 338.29 Encounter for screening     ICD-10-CM: Z13.9 ICD-9-CM: V82.9 Partes vitales PS Pulso Temperatura Peso (percentil de crecimiento) BMI (IMC) Estado obstétrico  
 110/58 (BP 1 Location: Right arm, BP Patient Position: Sitting) 71 97.8 °F (36.6 °C) (Oral) 122 lb (55.3 kg) 22.74 kg/m2 Postmenopausal  
 Estatus de tabaquísmo Never Smoker Historial de signos vitales BMI and BSA Data Body Mass Index Body Surface Area 22.74 kg/m 2 1.55 m 2 [facility] Name Phone [facility] [address] Hoffman lista de medicamentos actualizada Lista actualizada el: 11/30/17 12:52 PM.  Marvel Chappell use hoffman lista de medicamentos más reciente. acetaminophen 650 mg Tber También conocido parviz:  TYLENOL Take 650 mg by mouth every six (6) hours as needed for Pain. aspirin delayed-release 81 mg tablet Take  by mouth daily. levothyroxine 150 mcg tablet También conocido parviz:  SYNTHROID Take 0.5 Tabs by mouth Daily (before breakfast). Malawian instructions  
  
 pravastatin 10 mg tablet También conocido parviz:  PRAVACHOL Take 1 Tab by mouth nightly. [place] 1 tableta kendall vez al noche  
  
 traMADol 50 mg tablet También conocido parviz:  ULTRAM  
Take 0.5 Tabs by mouth every six (6) hours as needed for Pain. Max Daily Amount: 100 mg. [place]ian instructions Impresion de recetas Refills  
 traMADol (ULTRAM) 50 mg tablet 0 Sig: Take 0.5 Tabs by mouth every six (6) hours as needed for Pain. Max Daily Amount: 100 mg. [place]ian instructions Class: Print Route: Oral  
  
Recetas Enviado a la [place] Refills  
 levothyroxine (SYNTHROID) 150 mcg tablet 2 Sig: Take 0.5 Tabs by mouth Daily (before breakfast). [place]ian instructions Class: Normal  
 Pharmacy: [address] [place] [address] Ave  #: [phone] Route: Oral  
  
Hicimos lo siguiente AMB POC HEMOGLOBIN (HGB) [34901 CPT(R)] Instrucciones de seguimiento Return in about 3 months (around 2/28/2018) for Follow up appt, sooner if concerns/persistent sxs. Introducing [facility]! [facility] introduce portal paciente MyChart . Ahora se puede acceder a partes de hoffman expediente médico, enviar por correo electrónico la oficina de hoffman médico y solicitar renovaciones de medicamentos en línea. En hoffman navegador de Internet , Dewane Desanctis a [email or web address] Tyler clic en el usuario por Fierro Jose? Bhargavi Sea clic aquí en la sesión Steffan Apley. Verá la página de registro [place]. Ingrese hoffman código de Bank [employer] Meg[employer]ana maribell y parviz aparece a continuación. Usted no tendrá que [employer] código después de renan completado el proceso de registro . Si usted no se inscribe antes de la fecha de caducidad , debe solicitar un nuevo código. · MyChart Código de acceso : [employer] Expires: 1/17/2018 10:00 AM 
 
Ingresa los últimos cuatro dígitos de hoffman Número de Seguro Social ( xxxx ) y fecha de nacimiento ( dd / mm / aaaa ) parviz se indica y tyler clic en Enviar. Usted será llevado a la siguiente página de registro . Crear un ID MyChart . Esta será hoffman ID de inicio de sesión de MyChart y no puede ser [place] , por lo que pensar en kendall que es Vivian Grumet y fácil de recordar . Crear kendall contraseña MyChart . Usted puede cambiar hoffman contraseña en cualquier momento . Ingrese hoffman Password Reset de preguntas y [place] . [place] se puede utilizar en un momento posterior si usted olvida hoffman contraseña. Introduzca hoffman dirección de correo electrónico . Gómez Barth recibirá kendall notificación por correo electrónico cuando la nueva información está disponible en MyChart . Cristobal Bar clic en Registrarse. Nadine Lands francisco y descargar porciones de hoffman expediente médico. 
Tyler clic en el enlace de descarga del menú Resumen para descargar kendall copia portátil de hoffman información médica . Si tiene [employer] , por favor visite la sección de preguntas frecuentes del sitio web MyChart . Recuerde, MyChart NO es que se utilizará para las necesidades urgentes. Para emergencias médicas , llame al 911 . Ahora disponible en hoffman iPhone y Android ! Por favor proporcione ian resumen de la documentación de cuidado a hoffman próximo proveedor. If you have any questions after today's visit, please call [phone].

## 2017-11-30 NOTE — PROGRESS NOTES
Helen Willson is a 81 y.o. female    Issues discussed today include:    Chief Complaint   Patient presents with    Follow-up       1) Dizziness:  Continues to have dizziness. Only notices when moving her mattress of her sofabed back and forth from the laundry room where stored to the living room where she sleeps and naps. She occasionally notices when standing up from sitting. Denies falls. No room spinning. No headaches or other neurologic sxs. She has a h/o HTN, taking lisinopril 5mg daily. She also has chronic R shoulder pain, very rarely takes tramadol and this works well to control pain. No dizziness when taking tramadol. Drinks ~ 2 bottles of water daily. Data reviewed or ordered today:       Other problems include:  Patient Active Problem List   Diagnosis Code    Acquired hypothyroidism E03.9    Essential hypertension I10    Hyperlipidemia E78.5    DVT (deep venous thrombosis) ([facility]) I82.409       Medications:  Current Outpatient Prescriptions   Medication Sig Dispense Refill    traMADol (ULTRAM) 50 mg tablet Take 0.5 Tabs by mouth every six (6) hours as needed for Pain. Max Daily Amount: 100 mg. [place]ican instructions 30 Tab 0    levothyroxine (SYNTHROID) 150 mcg tablet Take 0.5 Tabs by mouth Daily (before breakfast). [place] Rican instructions 45 Tab 2    pravastatin (PRAVACHOL) 10 mg tablet Take 1 Tab by mouth nightly. [place] 1 tableta kendall vez al noche 90 Tab 3    aspirin delayed-release 81 mg tablet Take  by mouth daily.  acetaminophen (TYLENOL) 650 mg CR tablet Take 650 mg by mouth every six (6) hours as needed for Pain. Allergies:  No Known Allergies    LMP:  No LMP recorded. Patient is postmenopausal.    Social History     Social History    Marital status: SINGLE     Spouse name: N/A    Number of children: N/A    Years of education: N/A     Occupational History    Not on file.      Social History Main Topics    Smoking status: Never Smoker    Smokeless tobacco: Never Used  Alcohol use No    Drug use: No    Sexual activity: Not on file     Other Topics Concern    Not on file     Social History Narrative    ** Merged History Encounter **            Family History   Problem Relation Age of Onset    Family history unknown: Yes         Physical Exam   Visit Vitals    /58 (BP 1 Location: Right arm, BP Patient Position: Sitting)    Pulse 71    Temp 97.8 °F (36.6 °C) (Oral)    Wt 122 lb (55.3 kg)    BMI 22.74 kg/m2      BP Readings from Last 3 Encounters:   11/30/17 110/58   10/19/17 137/72   07/03/17 128/69     Constitutional: Appears well,  No acute distress, Vitals noted  Psychiatric:  Affect normal, Alert and Oriented to person/place/time  Eyes:  Conjunctiva clear, no drainage  ENT:  External ears and nose normal, Mucous membranes moist  Neck:  General inspection normal. Supple. Heart:  Normal HR, Normal S1 and S2,  Regular rhythm. No murmurs, rubs or gallops. Lungs:  Clear to auscultation, good respiratory effort, no wheezes, rales or rhonchi  Extremities: Without edema, good peripheral pulses  Skin:  Warm to palpation, without rashes    Lab Results   Component Value Date/Time    Hemoglobin (POC) 11.5 11/30/2017 10:29 AM    HGB 11.1 10/19/2017 11:06 AM       Lab Results   Component Value Date/Time    Glucose 102 10/19/2017 11:06 AM    Glucose POC 99 NF 07/03/2017 09:16 AM       Assessment/Plan:      ICD-10-CM ICD-9-CM    1. Dizziness R42 780.4    2. Acquired hypothyroidism E03.9 244.9 levothyroxine (SYNTHROID) 150 mcg tablet   3. Chronic right shoulder pain M25.511 719.41 traMADol (ULTRAM) 50 mg tablet    G89.29 338.29    4. Normocytic anemia D64.9 285.9 HAPTOGLOBIN      PATHOLOGIST REVIEW SMEARS      RETICULOCYTE COUNT   5. Low vitamin B12 level E53.8 266.2 METHYLMALONIC ACID      HOMOCYSTEINE, PLASMA   6. Encounter for screening Z13.9 V82.9 AMB POC HEMOGLOBIN (HGB)       Dizziness, c/w orthostatic hypotension. 83yo with BP today of 110/58 at rest. Stop Lisinopril. Encouraged to drink more water. TSH wnl last month, refilled levothyroxine 75mcg daily without dose adjustment  Refilled tramadol.  reviewed. Last tramadol rx given 10/2016  Mild normocytic anemia, chronic but not c/w iron deficiency per recent iron profile. Pt wishes to defer additional anemia labs until follow up, future labs ordered    Follow-up Disposition:  Return in about 3 months (around 2/28/2018) for Follow up appt, sooner if concerns/persistent sxs.         Jassi Carter MD  [address] [place] [address]

## 2018-08-07 ENCOUNTER — HOSPITAL ENCOUNTER (OUTPATIENT)
Dept: LAB | Age: 83
Discharge: HOME | End: 2018-08-07

## 2018-08-07 ENCOUNTER — OFFICE VISIT (OUTPATIENT)
Dept: FAMILY MEDICINE CLINIC | Age: 83
End: 2018-08-07

## 2018-08-07 VITALS
WEIGHT: 130 LBS | HEART RATE: 81 BPM | HEIGHT: 60 IN | DIASTOLIC BLOOD PRESSURE: 58 MMHG | SYSTOLIC BLOOD PRESSURE: 111 MMHG | TEMPERATURE: 98.6 F | BODY MASS INDEX: 25.52 KG/M2

## 2018-08-07 DIAGNOSIS — E03.9 HYPOTHYROIDISM, UNSPECIFIED TYPE: Primary | ICD-10-CM

## 2018-08-07 DIAGNOSIS — E78.5 HYPERLIPIDEMIA, UNSPECIFIED HYPERLIPIDEMIA TYPE: ICD-10-CM

## 2018-08-07 DIAGNOSIS — G89.29 CHRONIC RIGHT SHOULDER PAIN: ICD-10-CM

## 2018-08-07 DIAGNOSIS — D64.9 ANEMIA, UNSPECIFIED TYPE: ICD-10-CM

## 2018-08-07 DIAGNOSIS — E03.9 HYPOTHYROIDISM, UNSPECIFIED TYPE: ICD-10-CM

## 2018-08-07 DIAGNOSIS — M54.2 CHRONIC NECK PAIN: ICD-10-CM

## 2018-08-07 DIAGNOSIS — G89.29 CHRONIC NECK PAIN: ICD-10-CM

## 2018-08-07 DIAGNOSIS — M25.511 CHRONIC RIGHT SHOULDER PAIN: ICD-10-CM

## 2018-08-07 DIAGNOSIS — M25.562 ACUTE PAIN OF LEFT KNEE: ICD-10-CM

## 2018-08-07 LAB
CHOLEST SERPL-MCNC: 188 MG/DL
ERYTHROCYTE [DISTWIDTH] IN BLOOD BY AUTOMATED COUNT: 14.6 % (ref 11.5–14.5)
FERRITIN SERPL-MCNC: 81 NG/ML (ref 8–252)
HCT VFR BLD AUTO: 34.7 % (ref 35–47)
HDLC SERPL-MCNC: 58 MG/DL
HDLC SERPL: 3.2 {RATIO} (ref 0–5)
HGB BLD-MCNC: 10.6 G/DL (ref 11.5–16)
IRON SATN MFR SERPL: 17 % (ref 20–50)
IRON SERPL-MCNC: 54 UG/DL (ref 35–150)
LDLC SERPL CALC-MCNC: 101.6 MG/DL (ref 0–100)
LIPID PROFILE,FLP: ABNORMAL
MCH RBC QN AUTO: 27.2 PG (ref 26–34)
MCHC RBC AUTO-ENTMCNC: 30.5 G/DL (ref 30–36.5)
MCV RBC AUTO: 89.2 FL (ref 80–99)
NRBC # BLD: 0 K/UL (ref 0–0.01)
NRBC BLD-RTO: 0 PER 100 WBC
PLATELET # BLD AUTO: 198 K/UL (ref 150–400)
PMV BLD AUTO: 11.9 FL (ref 8.9–12.9)
RBC # BLD AUTO: 3.89 M/UL (ref 3.8–5.2)
TIBC SERPL-MCNC: 314 UG/DL (ref 250–450)
TRIGL SERPL-MCNC: 142 MG/DL (ref ?–150)
TSH SERPL DL<=0.05 MIU/L-ACNC: 0.63 UIU/ML (ref 0.36–3.74)
VLDLC SERPL CALC-MCNC: 28.4 MG/DL
WBC # BLD AUTO: 6.3 K/UL (ref 3.6–11)

## 2018-08-07 PROCEDURE — 85027 COMPLETE CBC AUTOMATED: CPT | Performed by: FAMILY MEDICINE

## 2018-08-07 PROCEDURE — 84443 ASSAY THYROID STIM HORMONE: CPT | Performed by: FAMILY MEDICINE

## 2018-08-07 PROCEDURE — 83540 ASSAY OF IRON: CPT | Performed by: FAMILY MEDICINE

## 2018-08-07 PROCEDURE — 82728 ASSAY OF FERRITIN: CPT | Performed by: FAMILY MEDICINE

## 2018-08-07 PROCEDURE — 80061 LIPID PANEL: CPT | Performed by: FAMILY MEDICINE

## 2018-08-07 RX ORDER — PRAVASTATIN SODIUM 10 MG/1
10 TABLET ORAL
Qty: 180 TAB | Refills: 1 | Status: SHIPPED | OUTPATIENT
Start: 2018-08-07 | End: 2018-09-27 | Stop reason: SDUPTHER

## 2018-08-07 RX ORDER — TRAMADOL HYDROCHLORIDE 50 MG/1
25 TABLET ORAL
Qty: 30 TAB | Refills: 1 | Status: SHIPPED | OUTPATIENT
Start: 2018-08-07 | End: 2018-09-27

## 2018-08-07 NOTE — PROGRESS NOTES
Coordination of Care  1. Have you been to the ER, urgent care clinic since your last visit? Hospitalized since your last visit? No    2. Have you seen or consulted any other health care providers outside of the [address] [place] [address] since your last visit? Include any pap smears or colon screening. No    Does the patient need refills? YES    Learning Assessment Complete?  yes  Depression Screening complete in the past 12 months? yes

## 2018-08-07 NOTE — PROGRESS NOTES
Printed AVS, provided to pt and reviewed. Pt indicated understanding and had no questions. Told pt that rx's have been sent to pharmacy and they should be ready for  in approximately 2 hrs. The pt was given the written and signed rx for the Tramadol. Reviewed all the medication's ordered today with the pt. The pt was told the Thyroid medication would be called in to her Pharm after the lab results are reviewed by the Provider  Explained procedure for obtaining xray as a walk-in and told pt to go to Outpatient Registration. Provided address and directions to facility. Told pt that someone will call them after we get results. Told pt to ask about the care card which is our financial assistance program.  Also told pt that they will be required to do a financial screening  Also told them that if they get a bill before they get their card to call the phone # on the bill to let them know they have applied for the Care Card. Gave pt financial assistance application and highlighted for them the [employer] assistance phone number for them as well. The  was Ana Mckeon.  Masha Guo RN old records/vital signs/nurses notes

## 2018-08-07 NOTE — PATIENT INSTRUCTIONS
Jeannine: Ejercicios - [ Neck: Exercises ]  Instrucciones de cuidado  Aquí se presentan algunos ejemplos de ejercicios típicos de rehabilitación para tratar hoffman afección. Empiece cada ejercicio lentamente. Reduzca la intensidad del ejercicio si Rema Artist a tener dolor. Hoffman médico o fisioterapeuta le dirán cuándo puede comenzar con estos ejercicios y cuáles funcionarán mejor para usted. Cómo hacer los ejercicios  Estiramiento del jeannine    1. Stefanie estiramiento funciona mejor si mantiene el hombro hacia abajo mientras se inclina en sentido contrario. Para ayudarle a acordarse de esto, empiece por relajar los hombros y asirse levemente a rhonda muslos o a hoffman silla. 2. Incline la shalini hacia el hombro y [place] 15 a 30 segundos. Deje que el peso de la shalini estire los músculos. 3. Si desea un pequeño estiramiento adicional, use hoffman mano para jalar [place] y constantemente la shalini hacia el hombro. Por ejemplo, con hoffman hombro derecho hacia abajo, incline hoffman shalini hacia la izquierda. 4. Repita de 2 a 4 veces para cada hombro. Estiramiento del jeannine en diagonal    1. Gire la shalini ligeramente en la dirección en la que esté haciendo la extensión, e incline la shalini en diagonal hacia el pecho y [place] 15 a 30 segundos. 2. Si desea un pequeño estiramiento adicional, use hoffman mano para jalar suave y constantemente la shalini hacia adelante en diagonal.  3. Repita de 2 a 4 veces para cada lado. Estiramiento con deslizamiento dorsal    El deslizamiento dorsal estira la parte posterior del jeannine. Si siente dolor, no lo deslice tan atrás. Algunas personas encuentran que stefanie ejercicio resulta más fácil de hacer mientras están acostadas boca Radonna Comp, con kendall bolsa de hielo en el jeannine. 1. Siéntese o párese derecho y [employer]. 2. Poco a poco meta la barbilla mientras desliza la shalini hacia atrás sobre hoffman cuerpo.   3. Manténgala contando hasta 6 y luego relájela berkley 10 segundos. 4. Repita de 8 a 12 veces. Estiramiento del pecho y del hombro    1. Siéntese o párese derecho y deslice la shalini hacia atrás parviz en el estiramiento con deslizamiento dorsal.  2. [place] ambos brazos para que rhonda sandra queden cerca de los oídos. 3. [place] kendall respiración profunda, y a medida que [place], lleve los codos [place] abajo y detrás de la espalda. Sentirá que los omóplatos Amanda Brittle y [place], y al mismo tiempo sentirá un estiramiento en el pecho y en la parte delantera de rhonda hombros.  4. Manténgalos así berkley unos 6 segundos y luego relájelos berkley 10 segundos. 5. Repita de 8 a 12 veces. [place]: Sandra en la shalini    1. Mueva hoffman shalini hacia atrás, hacia adelante y de lado a lado en kendall suave presión contra rhonda sandra, manteniendo cada posición berkley unos 6 segundos. 2. Repita de 8 a 12 veces. La atención de seguimiento es kendall parte clave de hoffman tratamiento y seguridad. Asegúrese de hacer y acudir a todas las citas, y llame a hoffman médico si está teniendo problemas. También es kendall buena idea saber los resultados de rhonda exámenes y mantener kendall lista de los medicamentos que thea. ¿Dónde puede encontrar más información en inglés? Radha Melchor a http://rom-agueda.info/. Silkeribebeto P975 en la búsqueda para aprender más acerca de \"Jeannine: Ejercicios - [ Neck: Exercises ]. \"  Revisado: 29 noviembre, 2017  Versión del contenido: 11.7  © 3587-5008 [employer]. Las instrucciones de cuidado fueron adaptadas bajo licencia por [facility]ctions (which disclaims liability or warranty for this information). Si usted tiene [place] afección médica o sobre estas instrucciones, siempre pregunte a hoffman profesional de beba. [employer] niega toda garantía o responsabilidad por hoffman uso de esta información.

## 2018-08-07 NOTE — PROGRESS NOTES
Luna Sullivan is a 81 y.o. female    Issues discussed today include:    Chief Complaint   Patient presents with    Medication Refill     Thyroid, cholesterol & BP    Knee Pain     Left knee pain x3 days       1) Chronic dz med refills:  Here for thyroid check. Needs refills on cholesterol and thyroid medication. Has not run out, but has 1-2 days worth left. 2) Joint pain:  C/o neck pain, this is chronic. Located behind both ears and entire posterior neck. Denies h/o trauma. Also with R shoulder pain, but no arm weakness/numbness. Sleeping with a soft pillow, has not tried switching to different pillow. Takes tylenol with some relief, tramadol she takes 1/2 tab before bed prn and this helps. She tried a whole tab, but it made her throw up. Lately with Left knee pain and some mild swelling off and on. No injury. No known h/o OA. Data reviewed or ordered today:       Other problems include:  Patient Active Problem List   Diagnosis Code    Acquired hypothyroidism E03.9    Essential hypertension I10    Hyperlipidemia E78.5    DVT (deep venous thrombosis) ([facility]) I82.409       Medications:  Current Outpatient Prescriptions   Medication Sig Dispense Refill    pravastatin (PRAVACHOL) 10 mg tablet Take 1 Tab by mouth nightly. [place] 1 tableta kendall vez al noche 180 Tab 1    traMADol (ULTRAM) 50 mg tablet Take 0.5 Tabs by mouth every six (6) hours as needed for Pain. Max Daily Amount: 100 mg. Czech instructions 30 Tab 1    levothyroxine (SYNTHROID) 150 mcg tablet Take 0.5 Tabs by mouth Daily (before breakfast). [place] un mitad de tableta diario antes de desayuno 45 Tab 3    aspirin delayed-release 81 mg tablet Take  by mouth daily.  acetaminophen (TYLENOL) 650 mg CR tablet Take 650 mg by mouth every six (6) hours as needed for Pain. Allergies:  No Known Allergies    LMP:  No LMP recorded.  Patient is postmenopausal.    Social History     Social History    Marital status: SINGLE     Spouse name: N/A    Number of children: N/A    Years of education: N/A     Occupational History    Not on file. Social History Main Topics    Smoking status: Never Smoker    Smokeless tobacco: Never Used    Alcohol use No    Drug use: No    Sexual activity: Not on file     Other Topics Concern    Not on file     Social History Narrative    ** Merged History Encounter **            Family History   Problem Relation Age of Onset    Family history unknown: Yes         Physical Exam   Visit Vitals    /58 (BP 1 Location: Left arm)    Pulse 81    Temp 98.6 °F (37 °C) (Oral)    Ht 5' 0.24\" (1.53 m)    Wt 130 lb (59 kg)    BMI 25.19 kg/m2      BP Readings from Last 3 Encounters:   08/07/18 111/58   11/30/17 110/58   10/19/17 137/72     Constitutional: Appears well,  No acute distress, Vitals noted  Psychiatric:  Affect normal, Alert and Oriented to person/place/time  Eyes:  Conjunctiva clear, no drainage  ENT:  External ears and nose normal, Mucous membranes moist  Neck:  General inspection normal. Supple. Thyroid normal on palpation without nodules, enlargement nor tenderness. Heart:  Normal HR, Normal S1 and S2,  Regular rhythm. No murmurs, rubs or gallops. Lungs:  Clear to auscultation, good respiratory effort, no wheezes, rales or rhonchi  Extremities: Without edema, good peripheral pulses  MSK:  Left knee without effusion or deformity, + crepitus in blat knees, good ROM, tenderness lateral and superior to knee cap, no joint laxity; neck ttp post-auricular area and cervical spine and posterolateral neck, neck supple with FROM, neg spurlings test bilat  Skin:  Warm to palpation, without rashes      Assessment/Plan:      ICD-10-CM ICD-9-CM    1. Hypothyroidism, unspecified type E03.9 244.9 TSH 3RD GENERATION   2. Hyperlipidemia, unspecified hyperlipidemia type E78.5 272.4 pravastatin (PRAVACHOL) 10 mg tablet      LIPID PANEL   3.  Anemia, unspecified type D64.9 285.9 CBC W/O DIFF      IRON PROFILE FERRITIN   4. Chronic right shoulder pain M25.511 719.41 traMADol (ULTRAM) 50 mg tablet    G89.29 338.29    5. Chronic neck pain M54.2 723.1 traMADol (ULTRAM) 50 mg tablet    G89.29 338.29 XR SPINE CERV 4 OR 5 V   6. Acute pain of left knee M25.562 719.46 XR KNEE LT 3 V       1) Hypothyroidism - labs today, will resend rx once results in tomorrow    2) HLD - sent rx for pravastatin, lipid panel today    3) Neck and knee pain  - Xrays ordered, pls explain process for this  - Continue prn tramadol 25mg (1/2 tab) and tylenol for pain  - AVS with neck exercises    BP stable off lisinopril and dizziness resolved  Anemia, chronic - checking labs as per above, iron profile nl 1-2 yrs ago    Follow-up Disposition:  Return in about 6 weeks (around 9/18/2018) for Thyroid and knee/neck pain f/u appt.         Zac Diaz MD  [address] [place] [address]

## 2018-08-08 DIAGNOSIS — E03.9 ACQUIRED HYPOTHYROIDISM: ICD-10-CM

## 2018-08-08 RX ORDER — LEVOTHYROXINE SODIUM 150 UG/1
75 TABLET ORAL
Qty: 45 TAB | Refills: 3 | Status: SHIPPED | OUTPATIENT
Start: 2018-08-08 | End: 2018-09-27 | Stop reason: SDUPTHER

## 2018-08-08 NOTE — PROGRESS NOTES
TSH wnl, will continue same dose of levothyroxine (75mcg daily) - rx sent and routing to [facility] to inform pt of this, thanks    CBC with stable anemia, ferritin wnl and iron profile NOT c/w iron deficiency - will continue to observe  Lipid profile very good, recommend repeat in 2-3 yrs

## 2018-09-27 ENCOUNTER — OFFICE VISIT (OUTPATIENT)
Dept: FAMILY MEDICINE CLINIC | Age: 83
End: 2018-09-27

## 2018-09-27 VITALS
TEMPERATURE: 97.3 F | HEART RATE: 76 BPM | BODY MASS INDEX: 25.19 KG/M2 | SYSTOLIC BLOOD PRESSURE: 133 MMHG | WEIGHT: 130 LBS | DIASTOLIC BLOOD PRESSURE: 72 MMHG

## 2018-09-27 DIAGNOSIS — E03.9 ACQUIRED HYPOTHYROIDISM: ICD-10-CM

## 2018-09-27 DIAGNOSIS — E78.5 HYPERLIPIDEMIA, UNSPECIFIED HYPERLIPIDEMIA TYPE: ICD-10-CM

## 2018-09-27 RX ORDER — PRAVASTATIN SODIUM 10 MG/1
10 TABLET ORAL
Qty: 180 TAB | Refills: 1 | Status: SHIPPED | OUTPATIENT
Start: 2018-09-27 | End: 2019-09-05

## 2018-09-27 RX ORDER — LEVOTHYROXINE SODIUM 150 UG/1
75 TABLET ORAL
Qty: 68 TAB | Refills: 3 | Status: SHIPPED | OUTPATIENT
Start: 2018-09-27 | End: 2019-09-05

## 2018-09-27 NOTE — PROGRESS NOTES
Coordination of Care 1. Have you been to the ER, urgent care clinic since your last visit? Hospitalized since your last visit? No 
 
2. Have you seen or consulted any other health care providers outside of the [facility] since your last visit? Include any pap smears or colon screening. No 
 
Does the patient need refills? YES Learning Assessment Complete? yes

## 2018-09-27 NOTE — PROGRESS NOTES
AVS printed and reviewed. E script discussed and Good Rx printed for cholesterol med. Discussion assisted by CAV , Ana Mendoza.

## 2018-09-27 NOTE — MR AVS SNAPSHOT
[facility] 
 
 
 [address] [place] [address] 
[phone] Patient: Trevon Spain MRN: [id number] Visit Information Jackie Akiko y Georgiana Personal Médico Departamento Teléfono del Dep. Número de visita 2018 12:30 PM Sai Barry, [address] [facility] [phone] Your Appointments 2018 12:30 PM  
Follow Up with Sai Barry MD  
[facility] ([facility]) Appt Note: f/u 6 wks per EP by DLL  
 [address] [facility] [phone] [id number] [phone]  
  
   
 [address] [place] [address] [phone] Upcoming Health Maintenance Date Due Shingrix Vaccine Age 50> (1 of 2) 1986 GLAUCOMA SCREENING Q2Y 2001 Bone Densitometry (Dexa) Screening 2001 Pneumococcal 65+ Low/Medium Risk (2 of 2 - PCV13) 2017 Influenza Age 5 to Adult 2018 DTaP/Tdap/Td series (2 - Td) 2026 Alergias  Review Complete El: 2018 Por: MD Luis Manuel Mcintosh An del:  2018 No Known Allergies Vacunas actuales Revisadas el:  2016 Cherry Amabile Influenza Vaccine 2016, 2016 Influenza Vaccine (Quad) PF 2016, 2016 Pneumococcal Polysaccharide (PPSV-23) 2016 TB Skin Test (PPD) 2016 Tdap 2016 No revisadas esta visita You Were Diagnosed With   
  
 Conifer [place] Acquired hypothyroidism     ICD-10-CM: E03.9 ICD-9-CM: 244.9 Hyperlipidemia, unspecified hyperlipidemia type     ICD-10-CM: E78.5 ICD-9-CM: 272.4 Partes vitales PS Pulso Temperatura Peso (percentil de crecimiento) BMI (IMC) Estado obstétrico  
 133/72 (BP 1 Location: Right arm, BP Patient Position: Sitting) 76 97.3 °F (36.3 °C) (Oral) 130 lb (59 kg) 25.19 kg/m2 Postmenopausal  
 Estatus de tabaquísmo Never Smoker Historial de signos vitales BMI and BSA Data Body Mass Index Body Surface Area  
 25.19 kg/m 2 1.58 m 2 [facility] Name Phone [address], [facility] [phone] Flanagan lista de medicamentos actualizada Tim Wilkins actualizada 9/27/18 12:18 PM.  Fidelia Payne use flanagan lista de medicamentos más reciente. acetaminophen 650 mg Tber También conocido parviz:  TYLENOL Take 650 mg by mouth every six (6) hours as needed for Pain. aspirin delayed-release 81 mg tablet Take  by mouth daily. levothyroxine 150 mcg tablet También conocido parviz:  SYNTHROID Take 0.5 Tabs by mouth Daily (before breakfast). Sunday take a full tablet  
  
 pravastatin 10 mg tablet También conocido parviz:  PRAVACHOL Take 1 Tab by mouth nightly. [place] 1 tableta kendall vez al noche Recetas Enviado a la Michelle Refills  
 levothyroxine (SYNTHROID) 150 mcg tablet 3 Sig: Take 0.5 Tabs by mouth Daily (before breakfast). Sunday take a full tablet Class: Normal  
 Pharmacy: [address] [place] [address]  Ph #: [phone] Route: Oral  
 pravastatin (PRAVACHOL) 10 mg tablet 1 Sig: Take 1 Tab by mouth nightly. [place] 1 tableta kendall vez al noche Class: Normal  
 Pharmacy: [address] [place] [address]  Ph #: [phone] Route: Oral  
  
Instrucciones para el Paciente Aprenda sobre el colesterol alto - [ Learning About High Cholesterol ] Qué es el colesterol alto? El colesterol es un tipo de grasa que está presente en la [place]. Es necesario para muchas funciones corporales, parviz producir nuevas células. El colesterol se produce en el [facility] y [place] proviene de los alimentos que se consumen.  
Si usted tiene [place], ian comienza a acumularse en rhonda arterias. [place] se llama endurecimiento de las arterias o aterosclerosis. El colesterol alto eleva hoffman riesgo de tener un ataque al corazón y un ataque cerebral. 
Hay diferentes tipos de colesterol. El LDL es el colesterol \"destini\". Wendy Lias el LDL puede elevar hoffman riesgo de tener kendall enfermedad cardíaca, un ataque [place] y un ataque cerebral. El HDL es el colesterol \"srinivasan\". Un HDL alto está vinculado con un riesgo más bajo de tener enfermedades cardíacas, un ataque al corazón y un ataque cerebral. 
Rhonda niveles de colesterol ayudan a hoffman médico a determinar hoffman riesgo de tener un ataque cardíaco o un ataque cerebral. 

Cómo puede prevenir el colesterol alto? Un estilo de derick saludable para el corazón puede ayudarle a prevenir el colesterol alto. Stefanie estilo de derick ayuda a reducir hoffman riesgo de tener un ataque [place] y un ataque cerebral. 
· Coma alimentos saludables para el corazón. ¨ Coma frutas, verduras, cereales integrales (parviz la harina de lexie), frijoles secos y arvejas (chícharos), nueces y semillas, productos de soya (parviz el tofu) y lácteos semidescremados o descremados. ¨ Reemplace la [place], la margarina y los aceites hidrogenados o parcialmente hidrogenados por aceite de chin o de canola (colza). (La margarina de aceite de canola sin grasas hidrogenadas -también llamadas grasas trans- es adecuada). ¨ Reemplace las don banuelos por pescado, aves y proteína de soya (parviz el tofu). ¨ Limite el consumo de alimentos procesados y empaquetados, parviz los chips (parviz fred fritas), las galletas saladas y las galletas dulces. · Manténgase activo. El ejercicio puede mejorar rhonda niveles de [place]. [place] por lo menos 30 minutos de ejercicio la mayoría de los días de la [place]. Caminar es kendall buena opción. [facility] desee hacer otras actividades, parviz jossrpamela, [employer], jugar al tenis o practicar otros deportes de equipo. · Mantenga un peso saludable. Baje de peso si lo necesita. · No fume. Si necesita ayuda para dejar de fumar, hable con hoffman médico sobre programas y medicamentos para dejar de fumar. Estos pueden aumentar rhonda probabilidades de dejar el hábito para siempre. Cómo se trata el colesterol alto? La meta de hoffamn tratamiento es reducir rhonda probabilidades de tener un ataque al corazón o un ataque cerebral. La meta no es solamente reducir rhonda cifras de colesterol. · Jluis vez pueda hacer cambios de estilo de derick saludables, parviz comer alimentos saludables, no fumar, adelgazar y [address] · Es posible que tenga que clark medicamentos. La atención de seguimiento es kendall parte clave de hoffman tratamiento y seguridad. Asegúrese de hacer y acudir a todas las citas, y llame a hoffman médico si está teniendo problemas. También es kendall buena idea saber los resultados de rhonda exámenes y mantener kendall lista de los medicamentos que thea. Dónde puede encontrar más información en inglés? Cedric Tapia a http://rom-agueda.info/. Fiona Aleman B364 en la búsqueda para aprender más acerca de \"Aprenda sobre el colesterol alto - [ Learning About High Cholesterol ]. \" 
Revisado: 10 [place], 2017 Versión del contenido: 11.7 © 0176-4486 Healthwise, Incorporated. Las instrucciones de cuidado fueron adaptadas bajo licencia por Good Help Connections (which disclaims liability or warranty for this information). Si usted tiene [place] afección médica o sobre estas instrucciones, siempre pregunte a hoffman profesional de beba. Healthwise, Incorporated niega toda garantía o responsabilidad por hoffman uso de esta información. Introducing [facility]! [facility] introduce portal paciente MyChart . Ahora se puede acceder a partes de hoffman expediente médico, enviar por correo electrónico la oficina de hoffman médico y solicitar renovaciones de medicamentos en línea.  
 
En hoffman navegador de Internet , Megan Montero a [email or web address] Tyler clic en el usuario por Vicki Files? José Blanco clic aquí en la sesión Randall Blind. Verá la página de registro [place]. Ingrese hoffman código de [employer] maribell y parviz aparece a continuación. Usted no tendrá que [employer] código después de renan completado el proceso de registro . Si usted no se inscribe antes de la fecha de caducidad , debe solicitar un nuevo código. · MyChart Código de acceso : [id number] Expires: 11/5/2018  1:53 PM 
 
Ingresa los últimos cuatro dígitos de hoffman Número de Seguro Social ( xxxx ) y fecha de nacimiento ( dd / mm / aaaa ) parviz se indica y tyler clic en Enviar. Usted será llevado a la siguiente página de registro . Crear un ID MyChart . Esta será hoffman ID de inicio de sesión de MyChart y no puede ser [place] , por lo que pensar en kendall que es Julia Montiel y fácil de recordar . Crear kendall contraseña MyChart . Usted puede cambiar hoffman contraseña en cualquier momento . Ingrese hoffman Password Reset de preguntas y Jeter . [place] se puede utilizar en un momento posterior si usted olvida hoffman contraseña. Introduzca hoffman dirección de correo electrónico . [place] recibirá kendall notificación por correo electrónico cuando la nueva información está disponible en MyChart . Suzen Serrano clic en Registrarse. Lauren Montoya francisco y descargar porciones de hoffman expediente médico. 
Tyler clic en el enlace de descarga del menú Resumen para descargar kendall copia portátil de hoffman información médica . Si tiene [employer] , por favor visite la sección de preguntas frecuentes del sitio web MyChart . Recuerde, MyChart NO es que se utilizará para las necesidades urgentes. Para emergencias médicas , llame al 911 . Ahora disponible en hoffman iPhone y Android ! Por favor proporcione ian resumen de la documentación de cuidado a hoffman próximo proveedor. Your primary care clinician is listed as Toya Thomas.  If you have any questions after today's visit, please call [phone].

## 2018-09-27 NOTE — PATIENT INSTRUCTIONS
Aprenda sobre el colesterol alto - [ Learning About High Cholesterol ] Qué es el colesterol alto? El colesterol es un tipo de grasa que está presente en la [place]. Es necesario para muchas funciones corporales, parvzi producir nuevas células. El colesterol se produce en el cuerpo y [place] proviene de los alimentos que se consumen. Si usted tiene [employer], ian comienza a acumularse en kelly arterias. [place] se llama endurecimiento de las arterias o aterosclerosis. El colesterol alto eleva hoffman riesgo de tener un ataque al corazón y un ataque cerebral. 
Hay diferentes tipos de colesterol. El LDL es el colesterol \"destini\". [place] Specter el LDL puede elevar hoffman riesgo de tener kendall enfermedad cardíaca, un ataque Geraline Duet y un ataque cerebral. El HDL es el colesterol \"srinivasan\". Un HDL alto está vinculado con un riesgo más bajo de tener enfermedades cardíacas, un ataque al corazón y un ataque cerebral. 
Kelly niveles de colesterol ayudan a hoffman médico a determinar hoffman riesgo de tener un ataque cardíaco o un ataque cerebral. 

Cómo puede prevenir el colesterol alto? Un estilo de derick saludable para el corazón puede ayudarle a prevenir el colesterol alto. Ian estilo de derick ayuda a reducir hoffman riesgo de tener un ataque Geraline Duet y un ataque cerebral. 
· Coma alimentos saludables para el corazón. ¨ Coma frutas, verduras, cereales integrales (parviz la harina de lexie), frijoles secos y arvejas (chícharos), nueces y semillas, productos de soya (parviz el tofu) y lácteos semidescremados o descremados. ¨ Reemplace la [place], la margarina y los aceites hidrogenados o parcialmente hidrogenados por aceite de chin o de canola (colza). (La margarina de aceite de canola sin grasas hidrogenadas -también llamadas grasas trans- es adecuada). ¨ Reemplace las don banuelos por pescado, aves y proteína de soya (parviz el tofu).  
¨ Limite el consumo de alimentos procesados y empaquetados, parviz los chips (parviz fred fritas), las galletas saladas y las galletas dulces. · Manténgase activo. El ejercicio puede mejorar rhonda niveles de [place]. [place] por lo menos 30 minutos de ejercicio la mayoría de los días de la [place]. Caminar es kendall buena opción. Media [employer] desee hacer otras actividades, parviz correpamela hardwick, [employer], jugar al tenis o practicar otros deportes de equipo. · Mantenga un peso saludable. Baje de peso si lo necesita. · No fume. Si necesita ayuda para dejar de fumar, hable con hoffman médico sobre programas y medicamentos para dejar de fumar. Estos pueden aumentar rhonda probabilidades de dejar el hábito para siempre. Cómo se trata el colesterol alto? La meta de hoffman tratamiento es reducir rhonda probabilidades de tener un ataque al corazón o un ataque cerebral. La meta no es solamente reducir rhonda cifras de colesterol. · Jluis vez pueda hacer cambios de estilo de derick saludables, parviz comer alimentos saludables, no fumar, adelgazar y [address] · Es posible que tenga que clark medicamentos. La atención de seguimiento es kendall parte clave de hoffman tratamiento y seguridad. Asegúrese de hacer y acudir a todas las citas, y llame a hoffman médico si está teniendo problemas. También es kendall buena idea saber los resultados de rhonda exámenes y mantener kendall lista de los medicamentos que thea. Dónde puede encontrar más información en inglés? Anand Ogden a http://rom-agueda.info/. Stefan Shafer L149 en la búsqueda para aprender más acerca de \"Aprenda sobre el colesterol alto - [ Learning About High Cholesterol ]. \" 
Revisado: 10 [place], 2017 Versión del contenido: 11.7 © 5714-6140 [employer]oke, [employer]. Las instrucciones de cuidado fueron adaptadas bajo licencia por Good Help Connections (which disclaims liability or warranty for this information).  Si usted tiene [place] Artemus afección médica o sobre estas instrucciones, siempre pregunte a hoffman profesional de beba. [facility], Incorporated niega toda garantía o responsabilidad por hoffman uso de esta información.

## 2018-09-27 NOTE — PROGRESS NOTES
HISTORY OF PRESENT ILLNESS Nina Hess is a 81 y.o. female. HPI Patient is here for follow up on her thyroid. She feels well. Needs to go to [place] on October 5th and would like to take medication with her for 4 months. No chest pain No shortness of breath No nausea No vomiting No diarrhea No constipation States she gets dizzy from time to time but denies any syncope. Recent lab results reviewed and everything is at target Review of Systems Constitutional: Negative for chills, diaphoresis, fever, malaise/fatigue and weight loss. Respiratory: Negative for cough, shortness of breath and wheezing. Cardiovascular: Negative for chest pain and palpitations. Gastrointestinal: Negative for abdominal pain, constipation, diarrhea, heartburn, nausea and vomiting. Neurological: Positive for dizziness. Negative for headaches. /72 (BP 1 Location: Right arm, BP Patient Position: Sitting)  Pulse 76  Temp 97.3 °F (36.3 °C) (Oral)   Wt 130 lb (59 kg)  BMI 25.19 kg/m2 Physical Exam  
Constitutional: She appears well-developed and well-nourished. HENT:  
Head: Normocephalic. Right Ear: External ear normal.  
Left Ear: External ear normal.  
Eyes: Conjunctivae and EOM are normal. Pupils are equal, round, and reactive to light. Neck: Normal range of motion. Neck supple. Cardiovascular: Normal rate, regular rhythm and normal heart sounds. No murmur heard. Pulmonary/Chest: [place] normal and breath sounds normal. No respiratory distress. She has no wheezes. Abdominal: Soft. Bowel sounds are normal. She exhibits no distension. There is no tenderness. There is no rebound. Musculoskeletal: Normal range of motion. She exhibits no edema or deformity. ASSESSMENT and PLAN Diagnoses and all orders for this visit: 
 
1. Acquired hypothyroidism 
-     levothyroxine (SYNTHROID) 150 mcg tablet; Take 0.5 Tabs by mouth Daily (before breakfast). Sunday take a full tablet 2. Hyperlipidemia, unspecified hyperlipidemia type -     pravastatin (PRAVACHOL) 10 mg tablet; Take 1 Tab by mouth nightly. [address] 1 tableta kendall dempsey Medications refilled for 4 months since the patient will be leaving to [place] next week

## 2019-09-05 ENCOUNTER — CLINICAL SUPPORT (OUTPATIENT)
Dept: FAMILY MEDICINE CLINIC | Age: 84
End: 2019-09-05

## 2019-09-05 ENCOUNTER — HOSPITAL ENCOUNTER (OUTPATIENT)
Dept: LAB | Age: 84
Discharge: HOME | End: 2019-09-05

## 2019-09-05 ENCOUNTER — OFFICE VISIT (OUTPATIENT)
Dept: FAMILY MEDICINE CLINIC | Age: 84
End: 2019-09-05

## 2019-09-05 VITALS
OXYGEN SATURATION: 99 % | DIASTOLIC BLOOD PRESSURE: 80 MMHG | TEMPERATURE: 98.5 F | HEART RATE: 70 BPM | BODY MASS INDEX: 23.99 KG/M2 | WEIGHT: 122.2 LBS | HEIGHT: 60 IN | SYSTOLIC BLOOD PRESSURE: 140 MMHG

## 2019-09-05 DIAGNOSIS — E78.5 HYPERLIPIDEMIA, UNSPECIFIED HYPERLIPIDEMIA TYPE: ICD-10-CM

## 2019-09-05 DIAGNOSIS — E03.9 ACQUIRED HYPOTHYROIDISM: ICD-10-CM

## 2019-09-05 DIAGNOSIS — E03.9 ACQUIRED HYPOTHYROIDISM: Primary | ICD-10-CM

## 2019-09-05 DIAGNOSIS — Z71.9 COUNSELED BY NURSE: Primary | ICD-10-CM

## 2019-09-05 LAB
ALBUMIN SERPL-MCNC: 4 G/DL (ref 3.5–5)
ALBUMIN/GLOB SERPL: 1.1 {RATIO} (ref 1.1–2.2)
ALP SERPL-CCNC: 125 U/L (ref 45–117)
ALT SERPL-CCNC: 24 U/L (ref 12–78)
ANION GAP SERPL CALC-SCNC: 6 MMOL/L (ref 5–15)
AST SERPL-CCNC: 21 U/L (ref 15–37)
BASOPHILS # BLD: 0 K/UL (ref 0–0.1)
BASOPHILS NFR BLD: 0 % (ref 0–1)
BILIRUB SERPL-MCNC: 0.4 MG/DL (ref 0.2–1)
BUN SERPL-MCNC: 16 MG/DL (ref 6–20)
BUN/CREAT SERPL: 18 (ref 12–20)
CALCIUM SERPL-MCNC: 9.2 MG/DL (ref 8.5–10.1)
CHLORIDE SERPL-SCNC: 106 MMOL/L (ref 97–108)
CHOLEST SERPL-MCNC: 236 MG/DL
CO2 SERPL-SCNC: 31 MMOL/L (ref 21–32)
CREAT SERPL-MCNC: 0.88 MG/DL (ref 0.55–1.02)
DIFFERENTIAL METHOD BLD: ABNORMAL
EOSINOPHIL # BLD: 0 K/UL (ref 0–0.4)
EOSINOPHIL NFR BLD: 0 % (ref 0–7)
ERYTHROCYTE [DISTWIDTH] IN BLOOD BY AUTOMATED COUNT: 14.6 % (ref 11.5–14.5)
GLOBULIN SER CALC-MCNC: 3.5 G/DL (ref 2–4)
GLUCOSE SERPL-MCNC: 100 MG/DL (ref 65–100)
HCT VFR BLD AUTO: 38 % (ref 35–47)
HDLC SERPL-MCNC: 58 MG/DL
HDLC SERPL: 4.1 {RATIO} (ref 0–5)
HGB BLD-MCNC: 11.2 G/DL (ref 11.5–16)
IMM GRANULOCYTES # BLD AUTO: 0 K/UL (ref 0–0.04)
IMM GRANULOCYTES NFR BLD AUTO: 0 % (ref 0–0.5)
LDLC SERPL CALC-MCNC: 143.6 MG/DL (ref 0–100)
LIPID PROFILE,FLP: ABNORMAL
LYMPHOCYTES # BLD: 1.9 K/UL (ref 0.8–3.5)
LYMPHOCYTES NFR BLD: 39 % (ref 12–49)
MCH RBC QN AUTO: 26.2 PG (ref 26–34)
MCHC RBC AUTO-ENTMCNC: 29.5 G/DL (ref 30–36.5)
MCV RBC AUTO: 89 FL (ref 80–99)
MONOCYTES # BLD: 0.3 K/UL (ref 0–1)
MONOCYTES NFR BLD: 5 % (ref 5–13)
NEUTS SEG # BLD: 2.7 K/UL (ref 1.8–8)
NEUTS SEG NFR BLD: 56 % (ref 32–75)
NRBC # BLD: 0 K/UL (ref 0–0.01)
NRBC BLD-RTO: 0 PER 100 WBC
PLATELET # BLD AUTO: 201 K/UL (ref 150–400)
PMV BLD AUTO: 12 FL (ref 8.9–12.9)
POTASSIUM SERPL-SCNC: 4.3 MMOL/L (ref 3.5–5.1)
PROT SERPL-MCNC: 7.5 G/DL (ref 6.4–8.2)
RBC # BLD AUTO: 4.27 M/UL (ref 3.8–5.2)
SODIUM SERPL-SCNC: 143 MMOL/L (ref 136–145)
T4 FREE SERPL-MCNC: 1.2 NG/DL (ref 0.8–1.5)
TRIGL SERPL-MCNC: 172 MG/DL (ref ?–150)
TSH SERPL DL<=0.05 MIU/L-ACNC: 4.5 UIU/ML (ref 0.36–3.74)
VLDLC SERPL CALC-MCNC: 34.4 MG/DL
WBC # BLD AUTO: 4.8 K/UL (ref 3.6–11)

## 2019-09-05 PROCEDURE — 84443 ASSAY THYROID STIM HORMONE: CPT

## 2019-09-05 PROCEDURE — 85025 COMPLETE CBC W/AUTO DIFF WBC: CPT

## 2019-09-05 PROCEDURE — 84439 ASSAY OF FREE THYROXINE: CPT

## 2019-09-05 PROCEDURE — 80053 COMPREHEN METABOLIC PANEL: CPT

## 2019-09-05 PROCEDURE — 80061 LIPID PANEL: CPT

## 2019-09-05 RX ORDER — LEVOTHYROXINE SODIUM 75 UG/1
75 TABLET ORAL
Qty: 90 TAB | Refills: 3 | Status: SHIPPED | OUTPATIENT
Start: 2019-09-05 | End: 2021-01-20 | Stop reason: SDUPTHER

## 2019-09-05 RX ORDER — PRAVASTATIN SODIUM 10 MG/1
10 TABLET ORAL
Qty: 90 TAB | Refills: 3 | Status: SHIPPED | OUTPATIENT
Start: 2019-09-05 | End: 2021-01-20 | Stop reason: SDUPTHER

## 2019-09-05 NOTE — PROGRESS NOTES
Patient's here this morning requesting to get medication refill for thyroid, blood pressure and cholesterol medication. Patient states that she traveled to [place] and came back in May. Patient states that she run out her thyroid medication that she bought from [place] about 2 weeks ago. Patient does not know the name or dose of the medication that she was taking and states that she discarded the empty box of medication. Chart review and patient LOV was 09/27/18, patient has been placed on the waiting list to be seen by the provider today. Of note patient was in line last week as well but did not make the cut to be seen. Nurse called [address] and per pharmacy tech patient does have a refill for 30 days for atorvastatin, but patient will need a new prescription after that.

## 2019-09-05 NOTE — PROGRESS NOTES
AVS printed and reviewed. F/u appt was scheduled. E scripts discussed. Coupon printed for [facility] Pravastatin. Pt asking for bp medicine and per discussion w/ provider - \"no meds ordered today, watch bp at this time , will review at next visit\". Discussion assisted by GOKUL Nepali interpreters [place] and the [facility] and [place].

## 2019-09-05 NOTE — PROGRESS NOTES
HISTORY OF PRESENT ILLNESS  Maya Gilliland is a 81 y.o. female. HPI  Patient states  She had been in [place]. She ran out of medications there. She had ran out of thyroid medication. Feels that when she stands to quickly she becomes a little dizzy. Review of Systems   Constitutional: Negative for chills, diaphoresis, fever, malaise/fatigue and weight loss. Respiratory: Negative for cough, shortness of breath and wheezing. Cardiovascular: Negative for chest pain and palpitations. Gastrointestinal: Negative for abdominal pain, constipation, diarrhea, heartburn, nausea and vomiting. Neurological: Positive for dizziness. Negative for headaches. /78 (BP 1 Location: Right arm, BP Patient Position: Sitting)   Pulse 70   Temp 98.5 °F (36.9 °C) (Oral)   Ht 5' 0.24\" (1.53 m)   Wt 122 lb 3.2 oz (55.4 kg)   SpO2 99%   BMI 23.68 kg/m²   Physical Exam   Constitutional: She appears well-developed and well-nourished. HENT:   Head: Normocephalic. Right Ear: External ear normal.   Left Ear: External ear normal.   Eyes: Pupils are equal, round, and reactive to light. Conjunctivae and EOM are normal.   Neck: Normal range of motion. Neck supple. Cardiovascular: Normal rate, regular rhythm and normal heart sounds. No murmur heard. Pulmonary/Chest: Effort normal and breath sounds normal. No respiratory distress. She has no wheezes. Abdominal: Soft. Bowel sounds are normal. She exhibits no distension. There is no tenderness. There is no rebound. Musculoskeletal: Normal range of motion. She exhibits no edema or deformity. ASSESSMENT and PLAN  Diagnoses and all orders for this visit:    1. Acquired hypothyroidism  -     levothyroxine (SYNTHROID) 75 mcg tablet; Take 1 Tab by mouth Daily (before breakfast). -     T4, FREE; Future  -     TSH 3RD GENERATION; Future  -     CBC WITH AUTOMATED DIFF; Future  -     LIPID PANEL;  Future  -     METABOLIC PANEL, COMPREHENSIVE; Future      Patient has not been taking her thyroid medication for the past 4 months.   We will restart her medication  We will check labs today  Follow up scheduled

## 2019-09-09 NOTE — PROGRESS NOTES
Normal liver function, kidney function, electrolytes  Thyroid function was a little off. We restarted her thyroid medication  Cholesterol was also a little off.   We will wait until next appointment to recheck labs  Will not need to add any new medications at this point

## 2019-10-08 NOTE — PROGRESS NOTES
Tc to the pt. Called the mobile number x2. It was busy. The home number was dialed and it was a wrong #. Called the contact #. The pt's Dtr was contacted. She is on the PHI. She is Christopher Solorzano. The Dtr was given all lab results and recommendation's. Neptali Riggins was the .  Black Carey RN

## 2019-10-16 ENCOUNTER — TELEPHONE (OUTPATIENT)
Dept: FAMILY MEDICINE CLINIC | Age: 84
End: 2019-10-16

## 2019-10-16 NOTE — TELEPHONE ENCOUNTER
A representative from [facility] # [id number] left a VM requesting medical records from [employer] . They would like to talk to someone from [facility] about Medical records .     Thank you

## 2019-10-17 NOTE — TELEPHONE ENCOUNTER
I contacted the [employer] office and I spoke with Alexis Fair to who I informed that we received the medical records request on 10.01.19 and that request was faxed to [facility] on the same day for processing. I provided my direct office phone number [phone] in case they need further assistance.     Rachel Casiano

## 2021-01-20 DIAGNOSIS — E78.5 HYPERLIPIDEMIA, UNSPECIFIED HYPERLIPIDEMIA TYPE: ICD-10-CM

## 2021-01-20 DIAGNOSIS — E03.9 ACQUIRED HYPOTHYROIDISM: ICD-10-CM

## 2021-01-20 RX ORDER — PRAVASTATIN SODIUM 10 MG/1
10 TABLET ORAL
Qty: 90 TAB | Refills: 3 | Status: SHIPPED | OUTPATIENT
Start: 2021-01-20

## 2021-01-20 RX ORDER — LEVOTHYROXINE SODIUM 75 UG/1
75 TABLET ORAL
Qty: 90 TAB | Refills: 3 | Status: SHIPPED | OUTPATIENT
Start: 2021-01-20

## 2021-01-20 NOTE — TELEPHONE ENCOUNTER
Received a faxed refill request from the pt's Pharmacy for Levothyroxine 75 mcg, and Pravastatin 10 mg. . The pt has not been to the [facility] or had an appt in over a yr 09/05/19 last OV. There is a call noted in the chart from 10/16/19, from someone requesting the pt's records- [facility]. It's not certain that the pt is receiving care else where. The message was routed to the provider.  Carlos Eduardo Morgan RN

## 2021-02-04 ENCOUNTER — TELEPHONE (OUTPATIENT)
Dept: FAMILY MEDICINE CLINIC | Age: 86
End: 2021-02-04

## 2021-02-04 NOTE — TELEPHONE ENCOUNTER
P/C to [facility] Sharma to schedule a COVID-19 vaccine appointment for February 5. I left a generic message because I was not sure if this phone number still belongs to the patient. I left a voice message requesting to contact our office at [phone] to confirm if this number belongs to the patient or not so we can remove it from the patient's profile. I did not disclose any private medical information and/or stated the reason of the call.     Suzy Balbuena